# Patient Record
Sex: FEMALE | Race: WHITE | Employment: OTHER | URBAN - METROPOLITAN AREA
[De-identification: names, ages, dates, MRNs, and addresses within clinical notes are randomized per-mention and may not be internally consistent; named-entity substitution may affect disease eponyms.]

---

## 2017-02-09 ENCOUNTER — ANESTHESIA (OUTPATIENT)
Dept: SURGERY | Facility: CLINIC | Age: 72
End: 2017-02-09
Payer: MEDICARE

## 2017-02-09 ENCOUNTER — ANESTHESIA EVENT (OUTPATIENT)
Dept: SURGERY | Facility: CLINIC | Age: 72
End: 2017-02-09
Payer: MEDICARE

## 2017-02-09 ENCOUNTER — HOSPITAL ENCOUNTER (OUTPATIENT)
Facility: CLINIC | Age: 72
Discharge: HOME | End: 2017-02-09
Attending: OTOLARYNGOLOGY | Admitting: OTOLARYNGOLOGY
Payer: MEDICARE

## 2017-02-09 VITALS
RESPIRATION RATE: 15 BRPM | BODY MASS INDEX: 18.41 KG/M2 | OXYGEN SATURATION: 96 % | HEIGHT: 71 IN | WEIGHT: 131.5 LBS | TEMPERATURE: 99.6 F | DIASTOLIC BLOOD PRESSURE: 85 MMHG | SYSTOLIC BLOOD PRESSURE: 164 MMHG

## 2017-02-09 DIAGNOSIS — J31.0 CHRONIC RHINITIS: Primary | ICD-10-CM

## 2017-02-09 LAB
BACTERIA SPEC CULT: NORMAL
Lab: NORMAL
MICRO REPORT STATUS: NORMAL
SPECIMEN SOURCE: NORMAL

## 2017-02-09 PROCEDURE — 88311 DECALCIFY TISSUE: CPT | Mod: 91 | Performed by: OTOLARYNGOLOGY

## 2017-02-09 PROCEDURE — 27210794 ZZH OR GENERAL SUPPLY STERILE: Performed by: OTOLARYNGOLOGY

## 2017-02-09 PROCEDURE — 25000132 ZZH RX MED GY IP 250 OP 250 PS 637: Mod: GY | Performed by: ANESTHESIOLOGY

## 2017-02-09 PROCEDURE — 88311 DECALCIFY TISSUE: CPT | Mod: 26 | Performed by: OTOLARYNGOLOGY

## 2017-02-09 PROCEDURE — 37000008 ZZH ANESTHESIA TECHNICAL FEE, 1ST 30 MIN: Performed by: OTOLARYNGOLOGY

## 2017-02-09 PROCEDURE — 87070 CULTURE OTHR SPECIMN AEROBIC: CPT | Mod: 91 | Performed by: OTOLARYNGOLOGY

## 2017-02-09 PROCEDURE — 36000073 ZZH SURGERY LEVEL 6 1ST 30 MIN: Performed by: OTOLARYNGOLOGY

## 2017-02-09 PROCEDURE — A9270 NON-COVERED ITEM OR SERVICE: HCPCS | Mod: GY | Performed by: ANESTHESIOLOGY

## 2017-02-09 PROCEDURE — 25000125 ZZHC RX 250: Performed by: ANESTHESIOLOGY

## 2017-02-09 PROCEDURE — 88304 TISSUE EXAM BY PATHOLOGIST: CPT | Performed by: OTOLARYNGOLOGY

## 2017-02-09 PROCEDURE — 25000132 ZZH RX MED GY IP 250 OP 250 PS 637: Mod: GY | Performed by: OTOLARYNGOLOGY

## 2017-02-09 PROCEDURE — 37000009 ZZH ANESTHESIA TECHNICAL FEE, EACH ADDTL 15 MIN: Performed by: OTOLARYNGOLOGY

## 2017-02-09 PROCEDURE — 27211024 ZZHC OR SUPPLY OTHER OPNP: Performed by: OTOLARYNGOLOGY

## 2017-02-09 PROCEDURE — 40000170 ZZH STATISTIC PRE-PROCEDURE ASSESSMENT II: Performed by: OTOLARYNGOLOGY

## 2017-02-09 PROCEDURE — 25800025 ZZH RX 258: Performed by: NURSE ANESTHETIST, CERTIFIED REGISTERED

## 2017-02-09 PROCEDURE — 27210995 ZZH RX 272: Performed by: OTOLARYNGOLOGY

## 2017-02-09 PROCEDURE — 71000012 ZZH RECOVERY PHASE 1 LEVEL 1 FIRST HR: Performed by: OTOLARYNGOLOGY

## 2017-02-09 PROCEDURE — 36000075 ZZH SURGERY LEVEL 6 EA 15 ADDTL MIN: Performed by: OTOLARYNGOLOGY

## 2017-02-09 PROCEDURE — 25000128 H RX IP 250 OP 636: Performed by: OTOLARYNGOLOGY

## 2017-02-09 PROCEDURE — 25000125 ZZHC RX 250: Performed by: OTOLARYNGOLOGY

## 2017-02-09 PROCEDURE — 71000013 ZZH RECOVERY PHASE 1 LEVEL 1 EA ADDTL HR: Performed by: OTOLARYNGOLOGY

## 2017-02-09 PROCEDURE — 25000125 ZZHC RX 250: Performed by: NURSE ANESTHETIST, CERTIFIED REGISTERED

## 2017-02-09 PROCEDURE — A9270 NON-COVERED ITEM OR SERVICE: HCPCS | Mod: GY | Performed by: OTOLARYNGOLOGY

## 2017-02-09 PROCEDURE — 71000027 ZZH RECOVERY PHASE 2 EACH 15 MINS: Performed by: OTOLARYNGOLOGY

## 2017-02-09 PROCEDURE — 25000566 ZZH SEVOFLURANE, EA 15 MIN: Performed by: OTOLARYNGOLOGY

## 2017-02-09 PROCEDURE — 88304 TISSUE EXAM BY PATHOLOGIST: CPT | Mod: 26,76 | Performed by: OTOLARYNGOLOGY

## 2017-02-09 PROCEDURE — 25000128 H RX IP 250 OP 636: Performed by: NURSE ANESTHETIST, CERTIFIED REGISTERED

## 2017-02-09 PROCEDURE — 87070 CULTURE OTHR SPECIMN AEROBIC: CPT | Performed by: OTOLARYNGOLOGY

## 2017-02-09 RX ORDER — MAGNESIUM HYDROXIDE 1200 MG/15ML
LIQUID ORAL PRN
Status: DISCONTINUED | OUTPATIENT
Start: 2017-02-09 | End: 2017-02-09 | Stop reason: HOSPADM

## 2017-02-09 RX ORDER — CEFAZOLIN SODIUM 1 G/3ML
1 INJECTION, POWDER, FOR SOLUTION INTRAMUSCULAR; INTRAVENOUS SEE ADMIN INSTRUCTIONS
Status: DISCONTINUED | OUTPATIENT
Start: 2017-02-09 | End: 2017-02-09 | Stop reason: HOSPADM

## 2017-02-09 RX ORDER — OXYCODONE AND ACETAMINOPHEN 5; 325 MG/1; MG/1
1 TABLET ORAL ONCE
Status: COMPLETED | OUTPATIENT
Start: 2017-02-09 | End: 2017-02-09

## 2017-02-09 RX ORDER — CEFAZOLIN SODIUM 2 G/100ML
2 INJECTION, SOLUTION INTRAVENOUS
Status: COMPLETED | OUTPATIENT
Start: 2017-02-09 | End: 2017-02-09

## 2017-02-09 RX ORDER — FENTANYL CITRATE 50 UG/ML
25-50 INJECTION, SOLUTION INTRAMUSCULAR; INTRAVENOUS DAILY PRN
Status: DISCONTINUED | OUTPATIENT
Start: 2017-02-09 | End: 2017-02-09 | Stop reason: HOSPADM

## 2017-02-09 RX ORDER — LIDOCAINE HYDROCHLORIDE AND EPINEPHRINE BITARTRATE 20; .01 MG/ML; MG/ML
INJECTION, SOLUTION SUBCUTANEOUS
Status: DISCONTINUED
Start: 2017-02-09 | End: 2017-02-09 | Stop reason: HOSPADM

## 2017-02-09 RX ORDER — EPINEPHRINE NASAL SOLUTION 1 MG/ML
SOLUTION NASAL PRN
Status: DISCONTINUED | OUTPATIENT
Start: 2017-02-09 | End: 2017-02-09 | Stop reason: HOSPADM

## 2017-02-09 RX ORDER — OXYMETAZOLINE HYDROCHLORIDE 0.05 G/100ML
SPRAY NASAL
Status: DISCONTINUED
Start: 2017-02-09 | End: 2017-02-09 | Stop reason: HOSPADM

## 2017-02-09 RX ORDER — MUPIROCIN 20 MG/G
OINTMENT TOPICAL PRN
Status: DISCONTINUED | OUTPATIENT
Start: 2017-02-09 | End: 2017-02-09 | Stop reason: HOSPADM

## 2017-02-09 RX ORDER — ONDANSETRON 2 MG/ML
4 INJECTION INTRAMUSCULAR; INTRAVENOUS EVERY 30 MIN PRN
Status: DISCONTINUED | OUTPATIENT
Start: 2017-02-09 | End: 2017-02-09 | Stop reason: HOSPADM

## 2017-02-09 RX ORDER — CEPHALEXIN 500 MG/1
500 CAPSULE ORAL 2 TIMES DAILY
Qty: 14 CAPSULE | Refills: 0 | Status: SHIPPED | OUTPATIENT
Start: 2017-02-09 | End: 2017-05-20

## 2017-02-09 RX ORDER — DEXAMETHASONE SODIUM PHOSPHATE 4 MG/ML
8 INJECTION, SOLUTION INTRA-ARTICULAR; INTRALESIONAL; INTRAMUSCULAR; INTRAVENOUS; SOFT TISSUE ONCE
Status: COMPLETED | OUTPATIENT
Start: 2017-02-09 | End: 2017-02-09

## 2017-02-09 RX ORDER — NEOSTIGMINE METHYLSULFATE 1 MG/ML
VIAL (ML) INJECTION PRN
Status: DISCONTINUED | OUTPATIENT
Start: 2017-02-09 | End: 2017-02-09

## 2017-02-09 RX ORDER — SODIUM CHLORIDE, SODIUM LACTATE, POTASSIUM CHLORIDE, CALCIUM CHLORIDE 600; 310; 30; 20 MG/100ML; MG/100ML; MG/100ML; MG/100ML
INJECTION, SOLUTION INTRAVENOUS CONTINUOUS
Status: DISCONTINUED | OUTPATIENT
Start: 2017-02-09 | End: 2017-02-09 | Stop reason: HOSPADM

## 2017-02-09 RX ORDER — OXYMETAZOLINE HYDROCHLORIDE 0.05 G/100ML
SPRAY NASAL PRN
Status: DISCONTINUED | OUTPATIENT
Start: 2017-02-09 | End: 2017-02-09 | Stop reason: HOSPADM

## 2017-02-09 RX ORDER — NALOXONE HYDROCHLORIDE 0.4 MG/ML
.1-.4 INJECTION, SOLUTION INTRAMUSCULAR; INTRAVENOUS; SUBCUTANEOUS
Status: DISCONTINUED | OUTPATIENT
Start: 2017-02-09 | End: 2017-02-09 | Stop reason: HOSPADM

## 2017-02-09 RX ORDER — FENTANYL CITRATE 50 UG/ML
25-50 INJECTION, SOLUTION INTRAMUSCULAR; INTRAVENOUS
Status: DISCONTINUED | OUTPATIENT
Start: 2017-02-09 | End: 2017-02-09 | Stop reason: HOSPADM

## 2017-02-09 RX ORDER — ONDANSETRON 2 MG/ML
INJECTION INTRAMUSCULAR; INTRAVENOUS PRN
Status: DISCONTINUED | OUTPATIENT
Start: 2017-02-09 | End: 2017-02-09

## 2017-02-09 RX ORDER — PROPOFOL 10 MG/ML
INJECTION, EMULSION INTRAVENOUS CONTINUOUS PRN
Status: DISCONTINUED | OUTPATIENT
Start: 2017-02-09 | End: 2017-02-09

## 2017-02-09 RX ORDER — LIDOCAINE HYDROCHLORIDE AND EPINEPHRINE BITARTRATE 20; .01 MG/ML; MG/ML
INJECTION, SOLUTION SUBCUTANEOUS PRN
Status: DISCONTINUED | OUTPATIENT
Start: 2017-02-09 | End: 2017-02-09 | Stop reason: HOSPADM

## 2017-02-09 RX ORDER — MEPERIDINE HYDROCHLORIDE 25 MG/ML
12.5 INJECTION INTRAMUSCULAR; INTRAVENOUS; SUBCUTANEOUS
Status: DISCONTINUED | OUTPATIENT
Start: 2017-02-09 | End: 2017-02-09 | Stop reason: HOSPADM

## 2017-02-09 RX ORDER — PROPOFOL 10 MG/ML
INJECTION, EMULSION INTRAVENOUS PRN
Status: DISCONTINUED | OUTPATIENT
Start: 2017-02-09 | End: 2017-02-09

## 2017-02-09 RX ORDER — LIDOCAINE HYDROCHLORIDE 20 MG/ML
INJECTION, SOLUTION INFILTRATION; PERINEURAL PRN
Status: DISCONTINUED | OUTPATIENT
Start: 2017-02-09 | End: 2017-02-09

## 2017-02-09 RX ORDER — ACETAMINOPHEN 325 MG/1
975 TABLET ORAL ONCE
Status: COMPLETED | OUTPATIENT
Start: 2017-02-09 | End: 2017-02-09

## 2017-02-09 RX ORDER — OXYCODONE AND ACETAMINOPHEN 5; 325 MG/1; MG/1
1-2 TABLET ORAL EVERY 4 HOURS PRN
COMMUNITY
End: 2017-05-20

## 2017-02-09 RX ORDER — OXYCODONE HYDROCHLORIDE 5 MG/1
5-10 TABLET ORAL
Status: DISCONTINUED | OUTPATIENT
Start: 2017-02-09 | End: 2017-02-09 | Stop reason: HOSPADM

## 2017-02-09 RX ORDER — LIDOCAINE HYDROCHLORIDE AND EPINEPHRINE 10; 10 MG/ML; UG/ML
INJECTION, SOLUTION INFILTRATION; PERINEURAL PRN
Status: DISCONTINUED | OUTPATIENT
Start: 2017-02-09 | End: 2017-02-09 | Stop reason: HOSPADM

## 2017-02-09 RX ORDER — GLYCOPYRROLATE 0.2 MG/ML
INJECTION, SOLUTION INTRAMUSCULAR; INTRAVENOUS PRN
Status: DISCONTINUED | OUTPATIENT
Start: 2017-02-09 | End: 2017-02-09

## 2017-02-09 RX ORDER — EPINEPHRINE NASAL SOLUTION 1 MG/ML
SOLUTION NASAL
Status: DISCONTINUED
Start: 2017-02-09 | End: 2017-02-09 | Stop reason: HOSPADM

## 2017-02-09 RX ORDER — SODIUM CHLORIDE, SODIUM LACTATE, POTASSIUM CHLORIDE, CALCIUM CHLORIDE 600; 310; 30; 20 MG/100ML; MG/100ML; MG/100ML; MG/100ML
INJECTION, SOLUTION INTRAVENOUS CONTINUOUS PRN
Status: DISCONTINUED | OUTPATIENT
Start: 2017-02-09 | End: 2017-02-09

## 2017-02-09 RX ORDER — GABAPENTIN 300 MG/1
300 CAPSULE ORAL ONCE
Status: COMPLETED | OUTPATIENT
Start: 2017-02-09 | End: 2017-02-09

## 2017-02-09 RX ORDER — ONDANSETRON 4 MG/1
4 TABLET, ORALLY DISINTEGRATING ORAL EVERY 30 MIN PRN
Status: DISCONTINUED | OUTPATIENT
Start: 2017-02-09 | End: 2017-02-09 | Stop reason: HOSPADM

## 2017-02-09 RX ORDER — MUPIROCIN 20 MG/G
OINTMENT TOPICAL
Status: DISCONTINUED
Start: 2017-02-09 | End: 2017-02-09 | Stop reason: HOSPADM

## 2017-02-09 RX ADMIN — PHENYLEPHRINE HYDROCHLORIDE 100 MCG: 10 INJECTION, SOLUTION INTRAMUSCULAR; INTRAVENOUS; SUBCUTANEOUS at 08:39

## 2017-02-09 RX ADMIN — FENTANYL CITRATE 25 MCG: 50 INJECTION, SOLUTION INTRAMUSCULAR; INTRAVENOUS at 11:41

## 2017-02-09 RX ADMIN — LIDOCAINE HYDROCHLORIDE 100 MG: 20 INJECTION, SOLUTION INFILTRATION; PERINEURAL at 07:37

## 2017-02-09 RX ADMIN — OXYCODONE HYDROCHLORIDE AND ACETAMINOPHEN 1 TABLET: 5; 325 TABLET ORAL at 13:47

## 2017-02-09 RX ADMIN — GABAPENTIN 300 MG: 300 CAPSULE ORAL at 07:24

## 2017-02-09 RX ADMIN — SODIUM CHLORIDE, POTASSIUM CHLORIDE, SODIUM LACTATE AND CALCIUM CHLORIDE: 600; 310; 30; 20 INJECTION, SOLUTION INTRAVENOUS at 07:36

## 2017-02-09 RX ADMIN — NEOSTIGMINE METHYLSULFATE 3 MG: 1 INJECTION INTRAMUSCULAR; INTRAVENOUS; SUBCUTANEOUS at 11:45

## 2017-02-09 RX ADMIN — PHENYLEPHRINE HYDROCHLORIDE 100 MCG: 10 INJECTION, SOLUTION INTRAMUSCULAR; INTRAVENOUS; SUBCUTANEOUS at 08:16

## 2017-02-09 RX ADMIN — SODIUM CHLORIDE, POTASSIUM CHLORIDE, SODIUM LACTATE AND CALCIUM CHLORIDE: 600; 310; 30; 20 INJECTION, SOLUTION INTRAVENOUS at 10:14

## 2017-02-09 RX ADMIN — PROPOFOL 170 MG: 10 INJECTION, EMULSION INTRAVENOUS at 07:37

## 2017-02-09 RX ADMIN — FENTANYL CITRATE 50 MCG: 50 INJECTION, SOLUTION INTRAMUSCULAR; INTRAVENOUS at 12:40

## 2017-02-09 RX ADMIN — ROCURONIUM BROMIDE 50 MG: 10 INJECTION INTRAVENOUS at 07:37

## 2017-02-09 RX ADMIN — GLYCOPYRROLATE 0.4 MG: 0.2 INJECTION, SOLUTION INTRAMUSCULAR; INTRAVENOUS at 11:45

## 2017-02-09 RX ADMIN — FENTANYL CITRATE 50 MCG: 50 INJECTION, SOLUTION INTRAMUSCULAR; INTRAVENOUS at 12:23

## 2017-02-09 RX ADMIN — PHENYLEPHRINE HYDROCHLORIDE 100 MCG: 10 INJECTION, SOLUTION INTRAMUSCULAR; INTRAVENOUS; SUBCUTANEOUS at 08:53

## 2017-02-09 RX ADMIN — PHENYLEPHRINE HYDROCHLORIDE 100 MCG: 10 INJECTION, SOLUTION INTRAMUSCULAR; INTRAVENOUS; SUBCUTANEOUS at 08:26

## 2017-02-09 RX ADMIN — PROPOFOL 50 MCG/KG/MIN: 10 INJECTION, EMULSION INTRAVENOUS at 07:37

## 2017-02-09 RX ADMIN — FENTANYL CITRATE 25 MCG: 50 INJECTION, SOLUTION INTRAMUSCULAR; INTRAVENOUS at 09:53

## 2017-02-09 RX ADMIN — PHENYLEPHRINE HYDROCHLORIDE 100 MCG: 10 INJECTION, SOLUTION INTRAMUSCULAR; INTRAVENOUS; SUBCUTANEOUS at 09:12

## 2017-02-09 RX ADMIN — CEFAZOLIN 1 G: 1 INJECTION, POWDER, FOR SOLUTION INTRAMUSCULAR; INTRAVENOUS at 11:38

## 2017-02-09 RX ADMIN — DEXAMETHASONE SODIUM PHOSPHATE 8 MG: 4 INJECTION, SOLUTION INTRAMUSCULAR; INTRAVENOUS at 08:00

## 2017-02-09 RX ADMIN — ONDANSETRON 4 MG: 2 INJECTION INTRAMUSCULAR; INTRAVENOUS at 11:38

## 2017-02-09 RX ADMIN — CEFAZOLIN SODIUM 2 G: 2 INJECTION, SOLUTION INTRAVENOUS at 07:40

## 2017-02-09 RX ADMIN — ONDANSETRON 4 MG: 2 INJECTION INTRAMUSCULAR; INTRAVENOUS at 08:00

## 2017-02-09 RX ADMIN — ACETAMINOPHEN 975 MG: 325 TABLET, FILM COATED ORAL at 07:25

## 2017-02-09 RX ADMIN — PROPOFOL 25 MCG/KG/MIN: 10 INJECTION, EMULSION INTRAVENOUS at 09:20

## 2017-02-09 RX ADMIN — FENTANYL CITRATE 100 MCG: 50 INJECTION, SOLUTION INTRAMUSCULAR; INTRAVENOUS at 07:37

## 2017-02-09 ASSESSMENT — COPD QUESTIONNAIRES: COPD: 0

## 2017-02-09 ASSESSMENT — LIFESTYLE VARIABLES: TOBACCO_USE: 0

## 2017-02-09 NOTE — IP AVS SNAPSHOT
[facility] Same Day Surgery    [address]    [place] [address]    Phone:  [phone]    Fax:  [phone]                                       After Visit Summary   2/9/2017    Lori Mane    MRN: [id number]           After Visit Summary Signature Page     I have received my discharge instructions, and my questions have been answered. I have discussed any challenges I see with this plan with the nurse or doctor.    ..........................................................................................................................................  Patient/Patient Representative Signature      ..........................................................................................................................................  Patient Representative Print Name and Relationship to Patient    ..................................................               ................................................  Date                                            Time    ..........................................................................................................................................  Reviewed by Signature/Title    ...................................................              ..............................................  Date                                                            Time

## 2017-02-09 NOTE — DISCHARGE INSTRUCTIONS
While you were at the hospital today you were given 975 mg of Tylenol at 0730. The recommended daily maximum dose is 4000 mg.       Same Day Surgery Discharge Instructions for  Sedation and General Anesthesia       It's not unusual to feel dizzy, light-headed or faint for up to 24 hours after surgery or while taking pain medication.  If you have these symptoms: sit for a few minutes before standing and have someone assist you when you get up to walk or use the bathroom.      You should rest and relax for the next 24 hours. We recommend you make arrangements to have an adult stay with you for at least 24 hours after your discharge.  Avoid hazardous and strenuous activity.      DO NOT DRIVE any vehicle or operate mechanical equipment for 24 hours following the end of your surgery.  Even though you may feel normal, your reactions may be affected by the medication you have received.      Do not drink alcoholic beverages for 24 hours following surgery.       Slowly progress to your regular diet as you feel able. It's not unusual to feel nauseated and/or vomit after receiving anesthesia.  If you develop these symptoms, drink clear liquids (apple juice, ginger ale, broth, 7-up, etc. ) until you feel better.  If your nausea and vomiting persists for 24 hours, please notify your surgeon.        All narcotic pain medications, along with inactivity and anesthesia, can cause constipation. Drinking plenty of liquids and increasing fiber intake will help.      For any questions of a medical nature, call your surgeon.      Do not make important decisions for 24 hours.      If you had general anesthesia, you may have a sore throat for a couple of days related to the breathing tube used during surgery.  You may use Cepacol lozenges to help with this discomfort.  If it worsens or if you develop a fever, contact your surgeon.       If you feel your pain is not well managed with the pain medications prescribed by your surgeon, please  contact your surgeon's office to let them know so they can address your concerns.       [facility]  Nasal Surgery Discharge Instructions  Angel Jernigan MD, Martinez Hughes MD, & Vaughn Marks MD, Alyson Melchor MD, Sen Howard MD    ? Do not blow, bump, or manipulate your nose.    ? If there is a splint on the outside of your nose, keep this splint and tape around it dry so that it doesn t come off.    ? You may continue to have nasal drainage for several days after surgery. Continue using the gauze pads under the nose to catch this drainage rather than wiping the nose or blowing it. When the drainage stops, you can stop applying the pads.    ? Do not take aspirin as it can increase the chance of bleeding.    ? Refrain from strenuous activity, heaving lifting, or any other activity that would cause you to strain and raise the blood pressure in your head for two weeks. Resuming activity too soon after surgery can cause headaches.    ? If you are experiencing swelling around the eyes, the more you sit in an upright position, the faster the swelling will go down. Notify the doctor if there is any bruising or swelling of the eye or a vision problem occurs.    ? It is normal for the nose to feel congested after surgery since the tissues are likely to be swollen. This feeling will subside over a one to two week period.    ? Thin plastic splints may have been sewn over the septum of your nose. You may see these splints inside your nose. They will be removed when you return to the office.    ? If you have any problems or other questions, please call [phone].    [facility]  [phone]

## 2017-02-09 NOTE — IP AVS SNAPSHOT
MRN:[id number]                      After Visit Summary   2/9/2017    Lori Mane    MRN: [id number]           Thank you!     Thank you for choosing [facility] for your care. Our goal is always to provide you with excellent care. Hearing back from our patients is one way we can continue to improve our services. Please take a few minutes to complete the written survey that you may receive in the mail after you visit with us. Thank you!        Patient Information     Date Of Birth          1945        About your hospital stay     You were admitted on:  February 9, 2017 [facility]    You were discharged on:  February 9, 2017       Who to Call     For medical emergencies, please call 911.  For non-urgent questions about your medical care, please call your primary care provider or clinic, [phone]  For questions related to your surgery, please call your surgery clinic        Attending Provider     Provider    Angel Jernigan MD       Primary Care Provider Office Phone # Fax #    Gregory Sheriff [phone]       [place] [address]        After Care Instructions     Discharge Instructions       Patient to follow up with surgeon Wednesday February 15.            No Aspirin, Ibuprofen or Naproxen products       for 14 days post surgery                  Further instructions from your care team       While you were at the hospital today you were given 975 mg of Tylenol at 0730. The recommended daily maximum dose is 4000 mg.       Same Day Surgery Discharge Instructions for  Sedation and General Anesthesia       It's not unusual to feel dizzy, light-headed or faint for up to 24 hours after surgery or while taking pain medication.  If you have these symptoms: sit for a few minutes before standing and have someone assist you when you get up to walk or use the bathroom.      You  should rest and relax for the next 24 hours. We recommend you make arrangements to have an adult stay with you for at least 24 hours after your discharge.  Avoid hazardous and strenuous activity.      DO NOT DRIVE any vehicle or operate mechanical equipment for 24 hours following the end of your surgery.  Even though you may feel normal, your reactions may be affected by the medication you have received.      Do not drink alcoholic beverages for 24 hours following surgery.       Slowly progress to your regular diet as you feel able. It's not unusual to feel nauseated and/or vomit after receiving anesthesia.  If you develop these symptoms, drink clear liquids (apple juice, ginger ale, broth, 7-up, etc. ) until you feel better.  If your nausea and vomiting persists for 24 hours, please notify your surgeon.        All narcotic pain medications, along with inactivity and anesthesia, can cause constipation. Drinking plenty of liquids and increasing fiber intake will help.      For any questions of a medical nature, call your surgeon.      Do not make important decisions for 24 hours.      If you had general anesthesia, you may have a sore throat for a couple of days related to the breathing tube used during surgery.  You may use Cepacol lozenges to help with this discomfort.  If it worsens or if you develop a fever, contact your surgeon.       If you feel your pain is not well managed with the pain medications prescribed by your surgeon, please contact your surgeon's office to let them know so they can address your concerns.       [facility]  Nasal Surgery Discharge Instructions  Angel Jernigan MD, Martinez Hughes MD, & Vaughn Marks MD, Alyson Melchor MD, Sen Howard MD    ? Do not blow, bump, or manipulate your nose.    ? If there is a splint on the outside of your nose, keep this splint and tape around it dry so that it doesn t come off.    ? You may continue to have nasal drainage for  "several days after surgery. Continue using the gauze pads under the nose to catch this drainage rather than wiping the nose or blowing it. When the drainage stops, you can stop applying the pads.    ? Do not take aspirin as it can increase the chance of bleeding.    ? Refrain from strenuous activity, heaving lifting, or any other activity that would cause you to strain and raise the blood pressure in your head for two weeks. Resuming activity too soon after surgery can cause headaches.    ? If you are experiencing swelling around the eyes, the more you sit in an upright position, the faster the swelling will go down. Notify the doctor if there is any bruising or swelling of the eye or a vision problem occurs.    ? It is normal for the nose to feel congested after surgery since the tissues are likely to be swollen. This feeling will subside over a one to two week period.    ? Thin plastic splints may have been sewn over the septum of your nose. You may see these splints inside your nose. They will be removed when you return to the office.    ? If you have any problems or other questions, please call [phone].    [facility]  [phone]    Pending Results     Date and Time Order Name Status Description    2/9/2017 0826 Sinus Culture Aerobic Bacterial In process     2/9/2017 0749 Nose Culture Aerobic Bacterial Preliminary             Admission Information        Provider Department Dept Phone    2/9/2017 Angel Jernigan MD [facility] [phone]      Your Vitals Were     Blood Pressure Temperature Respirations    134/65 mmHg 99.6  F (37.6  C) (Temporal) 10    Height Weight BMI (Body Mass Index)    1.803 m (5' 11\") 59.648 kg (131 lb 8 oz) 18.35 kg/m2    Pulse Oximetry          92%        MyChart Information     [employer] lets you send messages to your doctor, view your test results, renew your prescriptions, schedule appointments and more. To sign up, go to [email or web address] . Click " "on \"Log in\" on the left side of the screen, which will take you to the Welcome page. Then click on \"Sign up Now\" on the right side of the page.     You will be asked to enter the access code listed below, as well as some personal information. Please follow the directions to create your username and password.     Your access code is: [email or web address]  Expires: 5/10/2017 11:46 AM     Your access code will  in 90 days. If you need help or a new code, please call your [place] clinic or [phone].        Care EveryWhere ID     This is your Care EveryWhere ID. This could be used by other organizations to access your [place] medical records  [id number]           Review of your medicines      UNREVIEWED medicines. Ask your doctor about these medicines        Dose / Directions    albuterol 108 (90 BASE) MCG/ACT Inhaler   Commonly known as:  PROAIR HFA/PROVENTIL HFA/VENTOLIN HFA        Dose:  2 puff   Inhale 2 puffs into the lungs every 6 hours.   Refills:  0       calcium carbonate 500 MG tablet   Commonly known as:  OS-SILVINO 500 mg [place]. Ca        3 tablets daily   Refills:  0       estrogen (conjugated)-medroxyPROGESTERone 0.3-1.5 MG per tablet   Commonly known as:  PREMPRO        Dose:  1 tablet   Take 1 tablet by mouth daily   Refills:  0       glucosamine-chondroitin 500-400 MG Caps per capsule        Dose:  1 capsule   Take 1 capsule by mouth daily.   Refills:  0       MIRALAX Packet   Generic drug:  polyethylene glycol        2 scoops po q am -  start on 10/2   Quantity:  1 can   Refills:  11       MULTIVITAMIN ADULT PO        Dose:  2 chew tab   Take 2 chew tab by mouth daily   Refills:  0       NAPROXEN PO        Dose:  220 mg   Take 220 mg by mouth as needed for moderate pain   Refills:  0       vitamin B complex with vitamin C Tabs tablet   Commonly known as:  STRESS TAB        Dose:  1 tablet   Take 1 tablet by mouth daily.   Refills:  0         START taking        Dose / Directions    cephALEXin 500 MG " capsule   Commonly known as:  KEFLEX   Used for:  Chronic rhinitis        Dose:  500 mg   Take 1 capsule (500 mg) by mouth 2 times daily   Quantity:  14 capsule   Refills:  0         CONTINUE these medicines which have NOT CHANGED        Dose / Directions    oxyCODONE-acetaminophen 5-325 MG per tablet   Commonly known as:  PERCOCET        Dose:  1-2 tablet   Take 1-2 tablets by mouth every 4 hours as needed for moderate to severe pain   Refills:  0            Where to get your medicines      These medications were sent to [facility] - [address]  [address], [place] [address]     Phone:  [phone]    - cephALEXin 500 MG capsule             Protect others around you: Learn how to safely use, store and throw away your medicines at www.disposemymeds.org.             Medication List: This is a list of all your medications and when to take them. Check marks below indicate your daily home schedule. Keep this list as a reference.      Medications           Morning Afternoon Evening Bedtime As Needed    albuterol 108 (90 BASE) MCG/ACT Inhaler   Commonly known as:  PROAIR HFA/PROVENTIL HFA/VENTOLIN HFA   Inhale 2 puffs into the lungs every 6 hours.                                calcium carbonate 500 MG tablet   Commonly known as:  OS-SILVINO 500 mg [place]. Ca   3 tablets daily                                cephALEXin 500 MG capsule   Commonly known as:  KEFLEX   Take 1 capsule (500 mg) by mouth 2 times daily                                estrogen (conjugated)-medroxyPROGESTERone 0.3-1.5 MG per tablet   Commonly known as:  PREMPRO   Take 1 tablet by mouth daily                                glucosamine-chondroitin 500-400 MG Caps per capsule   Take 1 capsule by mouth daily.                                MIRALAX Packet   2 scoops po q am -  start on 10/2   Generic drug:  polyethylene glycol                                MULTIVITAMIN ADULT PO   Take 2 chew tab by mouth daily                                 NAPROXEN PO   Take 220 mg by mouth as needed for moderate pain                                oxyCODONE-acetaminophen 5-325 MG per tablet   Commonly known as:  PERCOCET   Take 1-2 tablets by mouth every 4 hours as needed for moderate to severe pain                                vitamin B complex with vitamin C Tabs tablet   Commonly known as:  STRESS TAB   Take 1 tablet by mouth daily.

## 2017-02-09 NOTE — BRIEF OP NOTE
[facility] Brief Operative Note    Pre-operative diagnosis: CHRONIC SINUSITIS AND SEPTAL DEVIATION    Post-operative diagnosis    Procedure: Procedure(s):  ENDOSCOPIC SINUS SURGERY,  OPEN SEPTOPLASTY, LATERAL NASAL WALL RECONSTRUCTION, Jasiel Bullosa resection - Wound Class: II-Clean Contaminated   - Wound Class: II-Clean Contaminated   Surgeon(s): Surgeon(s) and Role:     * Angel Jernigan MD - Primary   Estimated blood loss: 15 mL    Specimens:   ID Type Source Tests Collected by Time Destination   1 : BILATERAL NOSE CULTURE Fluid Nose FLUID CULTURE AEROBIC BACTERIAL, LABORATORY MISCELLANEOUS ORDER Angel Jernigan MD 2/9/2017  7:49 AM    2 : LEFT MAXILLARY SINUS AEROBIC CULTURE Fluid Nose FLUID CULTURE AEROBIC BACTERIAL, LABORATORY MISCELLANEOUS ORDER Angel Jernigan MD 2/9/2017  8:26 AM    A : LEFT SINUS CONTENTS Tissue Sinus Contents, Left SURGICAL PATHOLOGY EXAM Angel Jernigan MD 2/9/2017  8:20 AM    B : RIGHT JASIEL BULLOSA Tissue Nose SURGICAL PATHOLOGY EXAM Angel Jernigan MD 2/9/2017  8:33 AM    C : RIGHT MAXILLARY SINUS CONTENTS Tissue Sinus Contents, Maxillary, Right SURGICAL PATHOLOGY EXAM Angel Jernigan MD 2/9/2017  8:37 AM       Findings:

## 2017-02-09 NOTE — ANESTHESIA CARE TRANSFER NOTE
Patient: Lori Mane    Procedure(s):  ENDOSCOPIC SINUS SURGERY,  OPEN SEPTOPLASTY, LATERAL NASAL WALL RECONSTRUCTION, Zoey Bullosa resection - Wound Class: II-Clean Contaminated   - Wound Class: II-Clean Contaminated    Diagnosis: CHRONIC SINUSITIS AND SEPTAL DEVIATION   Diagnosis Additional Information: No value filed.    Anesthesia Type:   General, ETT     Note:  Airway :Face Mask  Patient transferred to:PACU  Comments: Uneventful transport to PACU   Report to RN - Delmis  Exchanging well; colo pink/natl  Pt responds appropriately to command  IV patent  Lips/teeth/dentition as preop status  Questions answered  /66   st  TAT 99.6  RR 16  Sat 96-97  Note: bain removed by RN prior leaving OR - output 200 mL and recorded by RN        Vitals: (Last set prior to Anesthesia Care Transfer)    CRNA VITALS  2/9/2017 1122 - 2/9/2017 1157      2/9/2017             Pulse: 119    Ht Rate: 118    SpO2: 96 %    Resp Rate (observed): (!) 1    Resp Rate (set): 10                Electronically Signed By: QUINTIN INFANTE CRNA  February 9, 2017  11:57 AM

## 2017-02-09 NOTE — ANESTHESIA PREPROCEDURE EVALUATION
Anesthesia Evaluation     . Pt has had prior anesthetic. Type: General    No history of anesthetic complications     ROS/MED HX    ENT/Pulmonary:     (+)Intermittent asthma Treatment: Inhaler prn,  , . .   (-) tobacco use, COPD and sleep apnea   Neurologic: Comment: C4-C5 spinal stenosis  Meniere's      Cardiovascular:        (-) hypertension, CAD, CHF and dyslipidemia   METS/Exercise Tolerance:  >4 METS   Hematologic:  - neg hematologic  ROS       Musculoskeletal:         GI/Hepatic:        (-) GERD and liver disease   Renal/Genitourinary:      (-) renal disease   Endo:      (-) Type I DM and Type II DM   Psychiatric:         Infectious Disease:         Malignancy:         Other:               Physical Exam  Normal systems: cardiovascular and pulmonary    Airway   Mallampati: II  TM distance: >3 FB  Neck ROM: limited    Dental   Comment: Pulled molar  Cracked tooth    Cardiovascular       Pulmonary                     Anesthesia Plan      History & Physical Review  History and physical reviewed and following examination; no interval change.    ASA Status:  2 .    NPO Status:  > 8 hours    Plan for General and ETT with Intravenous induction. Maintenance will be Balanced.    PONV prophylaxis:  Ondansetron (or other 5HT-3) and Dexamethasone or Solumedrol  Additional equipment: Videolaryngoscope 7.0 SHANKAR tube  Glidescope used in the past with some difficulty      Postoperative Care  Postoperative pain management:  IV analgesics and Multi-modal analgesia.      Consents  Anesthetic plan, risks, benefits and alternatives discussed with:  Patient..                          .

## 2017-02-10 LAB — COPATH REPORT: NORMAL

## 2017-02-10 NOTE — ANESTHESIA POSTPROCEDURE EVALUATION
Patient: Lori Mane    Procedure(s):  ENDOSCOPIC SINUS SURGERY,  OPEN SEPTOPLASTY, LATERAL NASAL WALL RECONSTRUCTION, Zoey Bullosa resection - Wound Class: II-Clean Contaminated   - Wound Class: II-Clean Contaminated    Diagnosis:CHRONIC SINUSITIS AND SEPTAL DEVIATION   Diagnosis Additional Information: No value filed.    Anesthesia Type:  General, ETT    Note:  Anesthesia Post Evaluation    Patient location during evaluation: PACU  Patient participation: Able to fully participate in evaluation  Level of consciousness: awake and alert  Pain management: adequate  Airway patency: patent  Cardiovascular status: acceptable  Respiratory status: acceptable  Hydration status: acceptable  PONV: none     Anesthetic complications: None          Last vitals:  Filed Vitals:    02/09/17 1245 02/09/17 1300 02/09/17 1349   BP: 134/65 142/80 164/85   Temp:      Resp: 10  15   SpO2: 92% 94% 96%         Electronically Signed By: Sen Torres MD  February 9, 2017  10:48 PM

## 2017-02-11 LAB
BACTERIA SPEC CULT: NORMAL
BACTERIA SPEC CULT: NORMAL
Lab: NORMAL
MICRO REPORT STATUS: NORMAL
MICRO REPORT STATUS: NORMAL
SPECIMEN SOURCE: NORMAL
SPECIMEN SOURCE: NORMAL

## 2017-02-11 NOTE — OP NOTE
DATE OF PROCEDURE:  02/09/2017      PREOPERATIVE DIAGNOSES:   1.  Chronic maxillary sinusitis.   2.  Septal deviation.   3.  Nasal vestibular stenosis.   4.  Right hollie bullosa.      POSTOPERATIVE DIAGNOSES:     1.  Bilateral chronic rhinitis.   2.  Chronic maxillary sinusitis.   3.  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   4.  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   5.  Right hollie bullosa.      PROCEDURE:   1.  Bilateral endoscopic maxillary antrostomy.   2.  Endoscopic excision of right hollie bullosa.   3.  Open septoplasty.   4.  Open lateral nasal wall reconstruction.   5.  Stereotactic image-guided sinus surgery.      INDICATIONS:  Lori Mane has a history of chronic nasal obstruction and a history of an injury in the past.  She has also had excision of a malignant skin lesion and has had a skin flap to the nose on the right side.  She has complained of chronic drainage from the nose and symptoms of discomfort more on the right than the left.  The possible risks, benefits, alternatives, probabilities of success of the procedures including the risks of continued nasal obstruction, deformity, bleeding, scarring, injury to structures around the sinuses including the eyes, tear duct, and brain, and the possibility of secondary nasal surgery were discussed and consent is obtained.      DESCRIPTION OF PROCEDURE:  Under general endotracheal anesthetic, the patient was prepped and draped and the nose injected in the usual fashion with 2% Xylocaine with 1:100,000 epinephrine.  The deformity consisted of an obvious traumatic break in the caudal septum causing a horizontal deviation to the right and high grade obstruction due to that deformity plus collapse of the right nasal mid vault.  She has CT evidence of chronic maxillary sinusitis.      It should be noted that both sides of the nose had yellow purulent drainage that was collected for culture prior to prepping.  The 0 degree and eventually 30  degree and 45 degree scopes were used first on the left side to visualize the middle meatus.  The uncinate was removed and the natural ostium was visualized and noted to be patent.  It was enlarged to an appropriate size and the interior of the sinus was cultured.  There was some liquid and it was relatively thin liquid in the sinus.  The middle meatus was packed with a cottonoid that had been soaked in topical adrenalin.  Because of the pus on both sides of the nose and the fact that the left maxillary sinus had improved some since the original CT scan, it was felt that recurring sinusitis might be a cause of her symptoms, especially since her symptoms were worse on the right side so a right maxillary antrostomy was also performed after excising the right hollie bullosa.  The image guidance system which had been put into place at the beginning of the procedure and verified for function was used to identify the air pocket within the right middle meatus.  It was incised with a sickle knife and the lateral turbinate wall was removed.  The anterior tip of the turbinate was left intact.  The mucosa inside the hollie bullosa seemed thickened.  There was no fluid found in the right maxillary sinus.      A transcolumellar incision was made and connected to marginal incisions to address the severe septal deviation and vestibular collapse.  It should also be noted that the lateral end of the right lower lateral cartilage was curled medially and reducing the right nasal airway in addition to the other deformities already described.  Consequently upon elevation of the dorsal skin flap the right lower lateral cartilage was dissected and the last few mm which were curled inferiorly and medially were excised leaving enough remaining cartilage to support the ala.        The medial crura of the lower lateral cartilages were pulled laterally with skin hooks and dissection carried out between them.  They were buckled in what appeared  to be an injury that may have occurred that pushed them towards the face.  That buckling was consistent with what was found when the caudal end of the septum was exposed.  This deformity revealed a sharp deflection causing a sharp deviation horizontally to the right impinging on the lateral nasal wall and causing obstruction in the valve area.  Careful dissection was used to dissect multiple pieces of cartilage anteriorly and it could be seen that much of the septum both bony and cartilaginous was missing despite the fact that she had not had previous surgery.  The septal flaps were carefully  all the way back to the posterior perpendicular plate of the ethmoid where some thin bone was identified and removed to be used later in the procedure.  It was stored in saline.  The nasal dorsum was exposed and an incision was made between the right upper lateral cartilage and the septum.  A strip of cartilage that was removed from the quadrangular cartilage where it was draped over the maxillary crest to the right was trimmed and used to create a  graft between the upper lateral cartilage and the septum.  It was put in place and held there with a 5-0 PDS suture through the septum graft and upper lateral cartilage in a mattress type suture.  The multiple pieces of cartilage that were forming the irregular caudal end of the septum were then addressed.  The inferior piece was anchored to a deformed nasal spine which seemed to be in the form of small trough with the left side longer than the right.  The remaining pieces were lined up in the midline and then the thin bone that had been resected from the perpendicular plate and perforated in 2 places was placed along this group of cartilage fragments and 5-0 PDS suture was used to splint the fragments with the thin bone creating a straight appearing caudal end of the septum.  The septal flaps were reapplied in the midline using a running through and through suture  of 4-0 plain.  A piece of cartilage was placed between the buckled medial crura as a columellar strut graft and used to splint the buckled area producing a straight medial franklyn on each side and giving more support to the columella.  The entire septum now appeared midline all the way to the columella.  There was only one small drainage opening in the right septal flap which was left in place.  No other tears in the septal mucosa were made.  The marginal incisions were closed using interrupted 4-0 chromic.  A single 4-0 chromic suture was used subcutaneously to close the midline of the transcolumellar incision.  The skin of the transcolumellar incision was closed using interrupted 6-0 chromic.  Oxymetazoline nasal spray was placed in both sides of the nose.  Silastic splints were anchored to the septum with a single 4-0 nylon suture.  The posterior end of the splints was placed between the middle turbinate and lateral nasal wall bilaterally to prevent synechia.  Cylindrical nostril splints were formed out of premade bivalve splints and anchored to the septum using a single 4-0 nylon suture.  Mupirocin ointment was placed over the transcolumellar incision and the procedure was completed.  The patient returned to the [facility] in good condition having tolerated the procedure well.  There were no apparent complications and blood loss was approximately 15 mL.  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  Time of the procedure was approximately 4 hours.         ORI LIM MD             D: 02/10/2017 14:15   T: 2017 04:37   MT: PHILLIP#[id number]      Name:     ABIDA DON   MRN:      [id number]        Account:        [id number]   :      1945           Procedure Date: 2017      Document: [id number]

## 2017-05-20 ENCOUNTER — HOSPITAL ENCOUNTER (OUTPATIENT)
Facility: CLINIC | Age: 72
Setting detail: OBSERVATION
Discharge: HOME | End: 2017-05-21
Attending: EMERGENCY MEDICINE | Admitting: INTERNAL MEDICINE
Payer: MEDICARE

## 2017-05-20 ENCOUNTER — APPOINTMENT (OUTPATIENT)
Dept: CT IMAGING | Facility: CLINIC | Age: 72
End: 2017-05-20
Attending: EMERGENCY MEDICINE
Payer: MEDICARE

## 2017-05-20 DIAGNOSIS — Q62.11 HYDRONEPHROSIS WITH URETEROPELVIC JUNCTION (UPJ) OBSTRUCTION: Primary | ICD-10-CM

## 2017-05-20 DIAGNOSIS — N20.0 KIDNEY STONE: ICD-10-CM

## 2017-05-20 DIAGNOSIS — R11.2 NON-INTRACTABLE VOMITING WITH NAUSEA, UNSPECIFIED VOMITING TYPE: ICD-10-CM

## 2017-05-20 LAB
ALBUMIN UR-MCNC: NEGATIVE MG/DL
AMORPH CRY #/AREA URNS HPF: ABNORMAL /HPF
ANION GAP SERPL CALCULATED.3IONS-SCNC: 6 MMOL/L (ref 3–14)
APPEARANCE UR: CLEAR
BILIRUB UR QL STRIP: NEGATIVE
BUN SERPL-MCNC: 15 MG/DL (ref 7–30)
CALCIUM SERPL-MCNC: 8.2 MG/DL (ref 8.5–10.1)
CHLORIDE SERPL-SCNC: 104 MMOL/L (ref 94–109)
CO2 SERPL-SCNC: 30 MMOL/L (ref 20–32)
COLOR UR AUTO: YELLOW
CREAT SERPL-MCNC: 0.73 MG/DL (ref 0.52–1.04)
GFR SERPL CREATININE-BSD FRML MDRD: 78 ML/MIN/1.7M2
GLUCOSE SERPL-MCNC: 171 MG/DL (ref 70–99)
GLUCOSE UR STRIP-MCNC: NEGATIVE MG/DL
HGB UR QL STRIP: ABNORMAL
KETONES UR STRIP-MCNC: NEGATIVE MG/DL
LEUKOCYTE ESTERASE UR QL STRIP: NEGATIVE
NITRATE UR QL: NEGATIVE
PH UR STRIP: 7 PH (ref 5–7)
POTASSIUM SERPL-SCNC: 3.5 MMOL/L (ref 3.4–5.3)
RBC #/AREA URNS AUTO: ABNORMAL /HPF (ref 0–2)
SODIUM SERPL-SCNC: 140 MMOL/L (ref 133–144)
SP GR UR STRIP: 1.02 (ref 1–1.03)
URN SPEC COLLECT METH UR: ABNORMAL
UROBILINOGEN UR STRIP-ACNC: 0.2 EU/DL (ref 0.2–1)
WBC #/AREA URNS AUTO: ABNORMAL /HPF (ref 0–2)

## 2017-05-20 PROCEDURE — 99285 EMERGENCY DEPT VISIT HI MDM: CPT | Mod: 25

## 2017-05-20 PROCEDURE — G0378 HOSPITAL OBSERVATION PER HR: HCPCS

## 2017-05-20 PROCEDURE — 96376 TX/PRO/DX INJ SAME DRUG ADON: CPT

## 2017-05-20 PROCEDURE — 96361 HYDRATE IV INFUSION ADD-ON: CPT

## 2017-05-20 PROCEDURE — 81001 URINALYSIS AUTO W/SCOPE: CPT | Performed by: EMERGENCY MEDICINE

## 2017-05-20 PROCEDURE — 25000128 H RX IP 250 OP 636: Performed by: EMERGENCY MEDICINE

## 2017-05-20 PROCEDURE — 96374 THER/PROPH/DIAG INJ IV PUSH: CPT

## 2017-05-20 PROCEDURE — 25000128 H RX IP 250 OP 636: Performed by: INTERNAL MEDICINE

## 2017-05-20 PROCEDURE — 83036 HEMOGLOBIN GLYCOSYLATED A1C: CPT | Performed by: EMERGENCY MEDICINE

## 2017-05-20 PROCEDURE — 80048 BASIC METABOLIC PNL TOTAL CA: CPT | Performed by: EMERGENCY MEDICINE

## 2017-05-20 PROCEDURE — 99220 ZZC INITIAL OBSERVATION CARE,LEVL III: CPT | Performed by: INTERNAL MEDICINE

## 2017-05-20 PROCEDURE — 96375 TX/PRO/DX INJ NEW DRUG ADDON: CPT

## 2017-05-20 PROCEDURE — 74176 CT ABD & PELVIS W/O CONTRAST: CPT

## 2017-05-20 RX ORDER — TAMSULOSIN HYDROCHLORIDE 0.4 MG/1
0.4 CAPSULE ORAL DAILY
Status: DISCONTINUED | OUTPATIENT
Start: 2017-05-21 | End: 2017-05-21 | Stop reason: HOSPADM

## 2017-05-20 RX ORDER — ONDANSETRON 2 MG/ML
4 INJECTION INTRAMUSCULAR; INTRAVENOUS EVERY 4 HOURS PRN
Status: DISCONTINUED | OUTPATIENT
Start: 2017-05-20 | End: 2017-05-21 | Stop reason: HOSPADM

## 2017-05-20 RX ORDER — HYDROMORPHONE HYDROCHLORIDE 1 MG/ML
0.5 INJECTION, SOLUTION INTRAMUSCULAR; INTRAVENOUS; SUBCUTANEOUS
Status: DISCONTINUED | OUTPATIENT
Start: 2017-05-20 | End: 2017-05-20

## 2017-05-20 RX ORDER — SODIUM CHLORIDE 9 MG/ML
1000 INJECTION, SOLUTION INTRAVENOUS CONTINUOUS
Status: DISCONTINUED | OUTPATIENT
Start: 2017-05-20 | End: 2017-05-20

## 2017-05-20 RX ORDER — DIPHENHYDRAMINE HYDROCHLORIDE 50 MG/ML
25 INJECTION INTRAMUSCULAR; INTRAVENOUS EVERY 6 HOURS PRN
Status: DISCONTINUED | OUTPATIENT
Start: 2017-05-20 | End: 2017-05-21 | Stop reason: HOSPADM

## 2017-05-20 RX ORDER — KETOROLAC TROMETHAMINE 15 MG/ML
15 INJECTION, SOLUTION INTRAMUSCULAR; INTRAVENOUS EVERY 6 HOURS PRN
Status: DISCONTINUED | OUTPATIENT
Start: 2017-05-21 | End: 2017-05-21 | Stop reason: HOSPADM

## 2017-05-20 RX ORDER — KETOROLAC TROMETHAMINE 30 MG/ML
30 INJECTION, SOLUTION INTRAMUSCULAR; INTRAVENOUS ONCE
Status: COMPLETED | OUTPATIENT
Start: 2017-05-20 | End: 2017-05-20

## 2017-05-20 RX ORDER — NALOXONE HYDROCHLORIDE 0.4 MG/ML
.1-.4 INJECTION, SOLUTION INTRAMUSCULAR; INTRAVENOUS; SUBCUTANEOUS
Status: DISCONTINUED | OUTPATIENT
Start: 2017-05-20 | End: 2017-05-21 | Stop reason: HOSPADM

## 2017-05-20 RX ORDER — ONDANSETRON 2 MG/ML
4 INJECTION INTRAMUSCULAR; INTRAVENOUS EVERY 30 MIN PRN
Status: DISCONTINUED | OUTPATIENT
Start: 2017-05-20 | End: 2017-05-20

## 2017-05-20 RX ORDER — SODIUM CHLORIDE AND POTASSIUM CHLORIDE 150; 900 MG/100ML; MG/100ML
INJECTION, SOLUTION INTRAVENOUS CONTINUOUS
Status: DISCONTINUED | OUTPATIENT
Start: 2017-05-20 | End: 2017-05-21 | Stop reason: HOSPADM

## 2017-05-20 RX ORDER — POLYETHYLENE GLYCOL 3350 17 G/17G
POWDER, FOR SOLUTION ORAL
COMMUNITY

## 2017-05-20 RX ORDER — ONDANSETRON 2 MG/ML
4 INJECTION INTRAMUSCULAR; INTRAVENOUS ONCE
Status: DISCONTINUED | OUTPATIENT
Start: 2017-05-20 | End: 2017-05-20

## 2017-05-20 RX ORDER — ALBUTEROL SULFATE 90 UG/1
2 AEROSOL, METERED RESPIRATORY (INHALATION) EVERY 6 HOURS PRN
Status: DISCONTINUED | OUTPATIENT
Start: 2017-05-20 | End: 2017-05-21 | Stop reason: HOSPADM

## 2017-05-20 RX ORDER — HYDROMORPHONE HYDROCHLORIDE 1 MG/ML
.3-.5 INJECTION, SOLUTION INTRAMUSCULAR; INTRAVENOUS; SUBCUTANEOUS
Status: DISCONTINUED | OUTPATIENT
Start: 2017-05-20 | End: 2017-05-21 | Stop reason: HOSPADM

## 2017-05-20 RX ORDER — DIPHENHYDRAMINE HCL 25 MG
25 CAPSULE ORAL EVERY 6 HOURS PRN
Status: DISCONTINUED | OUTPATIENT
Start: 2017-05-20 | End: 2017-05-21 | Stop reason: HOSPADM

## 2017-05-20 RX ADMIN — ONDANSETRON 4 MG: 2 SOLUTION INTRAMUSCULAR; INTRAVENOUS at 23:44

## 2017-05-20 RX ADMIN — SODIUM CHLORIDE 1000 ML: 9 INJECTION, SOLUTION INTRAVENOUS at 21:57

## 2017-05-20 RX ADMIN — ONDANSETRON 4 MG: 2 SOLUTION INTRAMUSCULAR; INTRAVENOUS at 21:57

## 2017-05-20 RX ADMIN — KETOROLAC TROMETHAMINE 30 MG: 30 INJECTION, SOLUTION INTRAMUSCULAR at 19:30

## 2017-05-20 RX ADMIN — ONDANSETRON 4 MG: 2 SOLUTION INTRAMUSCULAR; INTRAVENOUS at 19:30

## 2017-05-20 RX ADMIN — SODIUM CHLORIDE 1000 ML: 9 INJECTION, SOLUTION INTRAVENOUS at 19:32

## 2017-05-20 RX ADMIN — SODIUM CHLORIDE 1000 ML: 9 INJECTION, SOLUTION INTRAVENOUS at 23:26

## 2017-05-20 RX ADMIN — HYDROMORPHONE HYDROCHLORIDE 0.5 MG: 1 INJECTION, SOLUTION INTRAMUSCULAR; INTRAVENOUS; SUBCUTANEOUS at 23:44

## 2017-05-20 RX ADMIN — HYDROMORPHONE HYDROCHLORIDE 0.5 MG: 1 INJECTION, SOLUTION INTRAMUSCULAR; INTRAVENOUS; SUBCUTANEOUS at 20:16

## 2017-05-20 RX ADMIN — HYDROMORPHONE HYDROCHLORIDE 0.5 MG: 1 INJECTION, SOLUTION INTRAMUSCULAR; INTRAVENOUS; SUBCUTANEOUS at 22:33

## 2017-05-20 RX ADMIN — HYDROMORPHONE HYDROCHLORIDE 0.5 MG: 1 INJECTION, SOLUTION INTRAMUSCULAR; INTRAVENOUS; SUBCUTANEOUS at 19:57

## 2017-05-20 ASSESSMENT — ENCOUNTER SYMPTOMS
CONSTIPATION: 0
VOMITING: 1
NAUSEA: 1
DIARRHEA: 0
CHILLS: 0
HEMATURIA: 0
FLANK PAIN: 1
FEVER: 0

## 2017-05-20 NOTE — IP AVS SNAPSHOT
[facility]    [address] [place] [id number]    Phone:  [phone]                                       After Visit Summary   5/20/2017    Lori Mane    MRN: [id number]           After Visit Summary Signature Page     I have received my discharge instructions, and my questions have been answered. I have discussed any challenges I see with this plan with the nurse or doctor.    ..........................................................................................................................................  Patient/Patient Representative Signature      ..........................................................................................................................................  Patient Representative Print Name and Relationship to Patient    ..................................................               ................................................  Date                                            Time    ..........................................................................................................................................  Reviewed by Signature/Title    ...................................................              ..............................................  Date                                                            Time

## 2017-05-20 NOTE — IP AVS SNAPSHOT
MRN:[id number]                      After Visit Summary   5/20/2017    Lori Mane    MRN: [id number]           Thank you!     Thank you for choosing [facility] for your care. Our goal is always to provide you with excellent care. Hearing back from our patients is one way we can continue to improve our services. Please take a few minutes to complete the written survey that you may receive in the mail after you visit with us. Thank you!        Patient Information     Date Of Birth          1945        About your hospital stay     You were admitted on:  May 20, 2017 [facility]    You were discharged on:  May 21, 2017        Reason for your hospital stay       You were hospitalized for further evaluation and treatment of a right-sided kidney stone.                  Who to Call     For medical emergencies, please call 911.  For non-urgent questions about your medical care, please call your primary care provider or clinic, [phone]          Attending Provider     Provider Specialty    Ezio Lima DO Emergency Medicine    Taylor Monroy MD Internal Medicine       Primary Care Provider Office Phone # Fax #    Gregory Sheriff [phone]       [place] [address]        After Care Instructions     Activity       Your activity upon discharge: activity as tolerated            Diet       Follow this diet upon discharge: Regular Diet Adult            Discharge Instructions       1) Follow-up with PCP in the next week to discuss hospitalization   2) Follow-up with Urology (Dr. Becerra) as needed if stone does not pass   3) Prescription for Flomax sent at discharge; take as instructed   4) Pain control with OTC Aleve, as needed Oxycodone for moderate to severe pain, prescription sent at discharge   5) Return to ED with severe pain, fevers, or change/recurrence of symptoms.       "            Follow-up Appointments     Follow-up and recommended labs and tests        Follow up with primary care provider, Gregory Sheriff, within 7 days for hospital follow- up.  No follow up labs or test are needed.    Follow up with Dr. Becerra of Urology in the next week if stone hasn't passed.                  Pending Results     No orders found for last 3 day(s).            Statement of Approval     Ordered          17 1058  I have reviewed and agree with all the recommendations and orders detailed in this document.  EFFECTIVE NOW     Approved and electronically signed by:  Amanda Marquis PA-C             Admission Information     Date & Time Provider Department Dept. Phone    2017 Taylor Monroy MD [facility] [phone]      Your Vitals Were     Blood Pressure Pulse Temperature Respirations Height Weight    114/55 (BP Location: Left arm) 53 97  F (36.1  C) (Oral) 16 1.803 m (5' 11\") 62.7 kg (138 lb 4.8 oz)    Pulse Oximetry BMI (Body Mass Index)                93% 19.29 kg/m2          [email or web address] Information     [email or web address] lets you send messages to your doctor, view your test results, renew your prescriptions, schedule appointments and more. To sign up, go to [email or web address] . Click on \"Log in\" on the left side of the screen, which will take you to the Welcome page. Then click on \"Sign up Now\" on the right side of the page.     You will be asked to enter the access code listed below, as well as some personal information. Please follow the directions to create your username and password.     Your access code is: [email or web address]  Expires: 2017 11:28 AM     Your access code will  in 90 days. If you need help or a new code, please call your [facility] clinic or [phone].        Care EveryWhere ID     This is your Care EveryWhere ID. This could be used by other organizations to access your [facility] medical records  [id number]           Review of your medicines "      START taking        Dose / Directions    ondansetron 4 MG tablet   Commonly known as:  ZOFRAN   Used for:  Hydronephrosis with ureteropelvic junction (UPJ) obstruction   Notes to Patient:  Take as needed        Dose:  4 mg   Take 1 tablet (4 mg) by mouth every 8 hours as needed for nausea   Quantity:  18 tablet   Refills:  0       oxyCODONE 5 MG IR tablet   Commonly known as:  ROXICODONE   Used for:  Hydronephrosis with ureteropelvic junction (UPJ) obstruction   Notes to Patient:  Take as needed        Dose:  5 mg   Take 1 tablet (5 mg) by mouth every 4 hours as needed for moderate to severe pain   Quantity:  15 tablet   Refills:  0       tamsulosin 0.4 MG capsule   Commonly known as:  FLOMAX   Used for:  Hydronephrosis with ureteropelvic junction (UPJ) obstruction        Dose:  0.4 mg   Take 1 capsule (0.4 mg) by mouth daily   Quantity:  30 capsule   Refills:  0         CONTINUE these medicines which have NOT CHANGED        Dose / Directions    albuterol 108 (90 BASE) MCG/ACT Inhaler   Commonly known as:  PROAIR HFA/PROVENTIL HFA/VENTOLIN HFA   Notes to Patient:  Resume at discharge        Dose:  2 puff   Inhale 2 puffs into the lungs every 6 hours.   Refills:  0       estrogen (conjugated)-medroxyPROGESTERone 0.3-1.5 MG per tablet   Commonly known as:  PREMPRO   Notes to Patient:  Resume at discharge          Dose:  1 tablet   Take 1 tablet by mouth daily   Refills:  0       glucosamine-chondroitin 500-400 MG Caps per capsule   Notes to Patient:  Resume at discharge          Dose:  1 capsule   Take 1 capsule by mouth daily.   Refills:  0       MULTIVITAMIN ADULT PO   Notes to Patient:  Resume at discharge          Dose:  1 chew tab   Take 1 chew tab by mouth daily   Refills:  0       NAPROXEN PO   Notes to Patient:  Take as needed        Dose:  220 mg   Take 220 mg by mouth as needed for moderate pain   Refills:  0       polyethylene glycol Packet   Commonly known as:  MIRALAX/GLYCOLAX   Notes to Patient:   Take as needed        Use 2 scoops by mouth daily in the morning as needed   Refills:  0            Where to get your medicines      These medications were sent to [facility] [place], [place] - [address]  [address] [place] [address]     Phone:  [phone]     ondansetron 4 MG tablet    tamsulosin 0.4 MG capsule         Some of these will need a paper prescription and others can be bought over the counter. Ask your nurse if you have questions.     Bring a paper prescription for each of these medications     oxyCODONE 5 MG IR tablet                Protect others around you: Learn how to safely use, store and throw away your medicines at www.disposemymeds.org.             Medication List: This is a list of all your medications and when to take them. Check marks below indicate your daily home schedule. Keep this list as a reference.      Medications           Morning Afternoon Evening Bedtime As Needed    albuterol 108 (90 BASE) MCG/ACT Inhaler   Commonly known as:  PROAIR HFA/PROVENTIL HFA/VENTOLIN HFA   Inhale 2 puffs into the lungs every 6 hours.   Notes to Patient:  Resume at discharge                                estrogen (conjugated)-medroxyPROGESTERone 0.3-1.5 MG per tablet   Commonly known as:  PREMPRO   Take 1 tablet by mouth daily   Notes to Patient:  Resume at discharge                                  glucosamine-chondroitin 500-400 MG Caps per capsule   Take 1 capsule by mouth daily.   Notes to Patient:  Resume at discharge                                  MULTIVITAMIN ADULT PO   Take 1 chew tab by mouth daily   Notes to Patient:  Resume at discharge                                  NAPROXEN PO   Take 220 mg by mouth as needed for moderate pain   Notes to Patient:  Take as needed                                   ondansetron 4 MG tablet   Commonly known as:  ZOFRAN   Take 1 tablet (4 mg) by mouth every 8 hours as needed for nausea   Notes to Patient:  Take as needed                                    oxyCODONE 5 MG IR tablet   Commonly known as:  ROXICODONE   Take 1 tablet (5 mg) by mouth every 4 hours as needed for moderate to severe pain   Notes to Patient:  Take as needed                                   polyethylene glycol Packet   Commonly known as:  MIRALAX/GLYCOLAX   Use 2 scoops by mouth daily in the morning as needed   Notes to Patient:  Take as needed                                   tamsulosin 0.4 MG capsule   Commonly known as:  FLOMAX   Take 1 capsule (0.4 mg) by mouth daily   Last time this was given:  0.4 mg on 5/21/2017 11:03 AM   Next Dose Due:  5/22/17

## 2017-05-21 VITALS
BODY MASS INDEX: 19.36 KG/M2 | DIASTOLIC BLOOD PRESSURE: 55 MMHG | RESPIRATION RATE: 16 BRPM | HEIGHT: 71 IN | SYSTOLIC BLOOD PRESSURE: 114 MMHG | HEART RATE: 53 BPM | WEIGHT: 138.3 LBS | TEMPERATURE: 97 F | OXYGEN SATURATION: 93 %

## 2017-05-21 LAB
ANION GAP SERPL CALCULATED.3IONS-SCNC: 6 MMOL/L (ref 3–14)
BASOPHILS # BLD AUTO: 0 10E9/L (ref 0–0.2)
BASOPHILS NFR BLD AUTO: 0.2 %
BUN SERPL-MCNC: 10 MG/DL (ref 7–30)
CALCIUM SERPL-MCNC: 7.6 MG/DL (ref 8.5–10.1)
CHLORIDE SERPL-SCNC: 110 MMOL/L (ref 94–109)
CO2 SERPL-SCNC: 26 MMOL/L (ref 20–32)
CREAT SERPL-MCNC: 0.74 MG/DL (ref 0.52–1.04)
DIFFERENTIAL METHOD BLD: ABNORMAL
EOSINOPHIL # BLD AUTO: 0.1 10E9/L (ref 0–0.7)
EOSINOPHIL NFR BLD AUTO: 1.3 %
ERYTHROCYTE [DISTWIDTH] IN BLOOD BY AUTOMATED COUNT: 13.5 % (ref 10–15)
GFR SERPL CREATININE-BSD FRML MDRD: 77 ML/MIN/1.7M2
GLUCOSE SERPL-MCNC: 94 MG/DL (ref 70–99)
HBA1C MFR BLD: 5.6 % (ref 4.3–6)
HCT VFR BLD AUTO: 33.1 % (ref 35–47)
HGB BLD-MCNC: 11.1 G/DL (ref 11.7–15.7)
IMM GRANULOCYTES # BLD: 0 10E9/L (ref 0–0.4)
IMM GRANULOCYTES NFR BLD: 0.2 %
LYMPHOCYTES # BLD AUTO: 1.5 10E9/L (ref 0.8–5.3)
LYMPHOCYTES NFR BLD AUTO: 31.4 %
MCH RBC QN AUTO: 30.2 PG (ref 26.5–33)
MCHC RBC AUTO-ENTMCNC: 33.5 G/DL (ref 31.5–36.5)
MCV RBC AUTO: 90 FL (ref 78–100)
MONOCYTES # BLD AUTO: 0.3 10E9/L (ref 0–1.3)
MONOCYTES NFR BLD AUTO: 7.3 %
NEUTROPHILS # BLD AUTO: 2.8 10E9/L (ref 1.6–8.3)
NEUTROPHILS NFR BLD AUTO: 59.6 %
NRBC # BLD AUTO: 0 10*3/UL
NRBC BLD AUTO-RTO: 0 /100
PLATELET # BLD AUTO: 160 10E9/L (ref 150–450)
POTASSIUM SERPL-SCNC: 4 MMOL/L (ref 3.4–5.3)
RBC # BLD AUTO: 3.67 10E12/L (ref 3.8–5.2)
SODIUM SERPL-SCNC: 142 MMOL/L (ref 133–144)
WBC # BLD AUTO: 4.7 10E9/L (ref 4–11)

## 2017-05-21 PROCEDURE — 96376 TX/PRO/DX INJ SAME DRUG ADON: CPT

## 2017-05-21 PROCEDURE — 25000128 H RX IP 250 OP 636: Performed by: INTERNAL MEDICINE

## 2017-05-21 PROCEDURE — 99217 ZZC OBSERVATION CARE DISCHARGE: CPT | Performed by: PHYSICIAN ASSISTANT

## 2017-05-21 PROCEDURE — 25000132 ZZH RX MED GY IP 250 OP 250 PS 637: Mod: GY | Performed by: INTERNAL MEDICINE

## 2017-05-21 PROCEDURE — G0378 HOSPITAL OBSERVATION PER HR: HCPCS

## 2017-05-21 PROCEDURE — 36415 COLL VENOUS BLD VENIPUNCTURE: CPT | Performed by: PHYSICIAN ASSISTANT

## 2017-05-21 PROCEDURE — 96361 HYDRATE IV INFUSION ADD-ON: CPT

## 2017-05-21 PROCEDURE — 85025 COMPLETE CBC W/AUTO DIFF WBC: CPT | Performed by: PHYSICIAN ASSISTANT

## 2017-05-21 PROCEDURE — 80048 BASIC METABOLIC PNL TOTAL CA: CPT | Performed by: PHYSICIAN ASSISTANT

## 2017-05-21 PROCEDURE — 25000125 ZZHC RX 250: Performed by: INTERNAL MEDICINE

## 2017-05-21 RX ORDER — OXYCODONE HYDROCHLORIDE 5 MG/1
5 TABLET ORAL EVERY 4 HOURS PRN
Qty: 15 TABLET | Refills: 0 | Status: SHIPPED | OUTPATIENT
Start: 2017-05-21

## 2017-05-21 RX ORDER — TAMSULOSIN HYDROCHLORIDE 0.4 MG/1
0.4 CAPSULE ORAL DAILY
Qty: 30 CAPSULE | Refills: 0 | Status: SHIPPED | OUTPATIENT
Start: 2017-05-21

## 2017-05-21 RX ORDER — OXYCODONE HYDROCHLORIDE 5 MG/1
5 TABLET ORAL EVERY 4 HOURS PRN
Qty: 15 TABLET | Refills: 0 | Status: SHIPPED | OUTPATIENT
Start: 2017-05-21 | End: 2017-05-21

## 2017-05-21 RX ORDER — TAMSULOSIN HYDROCHLORIDE 0.4 MG/1
0.4 CAPSULE ORAL DAILY
Qty: 30 CAPSULE | Refills: 0 | Status: SHIPPED | OUTPATIENT
Start: 2017-05-21 | End: 2017-05-21

## 2017-05-21 RX ORDER — ONDANSETRON 4 MG/1
4 TABLET, FILM COATED ORAL EVERY 8 HOURS PRN
Qty: 18 TABLET | Refills: 0 | Status: SHIPPED | OUTPATIENT
Start: 2017-05-21

## 2017-05-21 RX ADMIN — HYDROMORPHONE HYDROCHLORIDE 0.5 MG: 1 INJECTION, SOLUTION INTRAMUSCULAR; INTRAVENOUS; SUBCUTANEOUS at 01:53

## 2017-05-21 RX ADMIN — KETOROLAC TROMETHAMINE 15 MG: 15 INJECTION, SOLUTION INTRAMUSCULAR; INTRAVENOUS at 03:01

## 2017-05-21 RX ADMIN — HYDROMORPHONE HYDROCHLORIDE 0.5 MG: 1 INJECTION, SOLUTION INTRAMUSCULAR; INTRAVENOUS; SUBCUTANEOUS at 03:02

## 2017-05-21 RX ADMIN — POTASSIUM CHLORIDE AND SODIUM CHLORIDE: 900; 150 INJECTION, SOLUTION INTRAVENOUS at 01:53

## 2017-05-21 RX ADMIN — HYDROMORPHONE HYDROCHLORIDE 0.5 MG: 1 INJECTION, SOLUTION INTRAMUSCULAR; INTRAVENOUS; SUBCUTANEOUS at 00:57

## 2017-05-21 NOTE — PHARMACY-ADMISSION MEDICATION HISTORY
Admission medication history interview status for the 5/20/2017  admission is complete. See EPIC admission navigator for prior to admission medications     Medication history source reliability:Good    Actions taken by pharmacist (provider contacted, etc):None     Additional medication history information not noted on PTA med list :    -Removed B complex, calcium, percocet, and cephalexin from med list   -Changed MVM from 2 chews to 1 chew tab daily     Medication reconciliation/reorder completed by provider prior to medication history? No    Time spent in this activity: 30 mins     Prior to Admission medications    Medication Sig Last Dose Taking? Auth Provider   Multiple Vitamins-Minerals (MULTIVITAMIN ADULT PO) Take 1 chew tab by mouth daily  5/20/2017 at Unknown time Yes Reported, Patient   NAPROXEN PO Take 220 mg by mouth as needed for moderate pain Past Week at prn Yes Reported, Patient   estrogen, conjugated,-medroxyPROGESTERone (PREMPRO) 0.3-1.5 MG per tablet Take 1 tablet by mouth daily 5/20/2017 at Unknown time Yes Reported, Patient   albuterol (PROVENTIL HFA: VENTOLIN HFA) 108 (90 BASE) MCG/ACT inhaler Inhale 2 puffs into the lungs every 6 hours.  at prn Yes Reported, Patient   glucosamine-chondroitin 500-400 MG CAPS Take 1 capsule by mouth daily.   5/20/2017 at Unknown time Yes Reported, Patient   MIRALAX OR PACK Use 2 scoops by mouth daily in the morning as needed  at prn Yes

## 2017-05-21 NOTE — PLAN OF CARE
Problem: Discharge Planning  Goal: Discharge Planning (Adult, OB, Behavioral, Peds)  Outcome: Adequate for Discharge Date Met:  05/21/17  Pt a/o x 4. VSS. Denies pain this morning. Voiding fine but still not passing the stone. Set to see urology outpatient for further care. Discharge instructions given. Medications given. All questions answered, all belongings sent with pt.

## 2017-05-21 NOTE — DISCHARGE SUMMARY
[facility]    Discharge Summary  Hospitalist    Date of Admission:  5/20/2017  Date of Discharge:  5/21/2017  Discharging Provider: Amanda Marquis PA-C  Date of Service (when I saw the patient): 05/21/17    Discharge Diagnoses   4 mm right UPJ stone with associated hydronephrosis   Intractable nausea and vomiting, resolved     History of Present Illness   A 72-year-old female with PMHx of asthma, spondylosis, and meniere's disease presenting with flank pain found to have a 4 mm UPJ stone with associated right hydronephrosis. Admitted to the observation unit for further evaluation and treatment. Vitals currently WNL. Pt currently stable. See H&P by Dr. Monroy from 5/20/17 for complete details on presentation.     Pt's pain much improved this AM. No N/V. Ran. Urology saw and plans for discharge with OP follow-up if stone is not passed in next week. Afebrile. No acute      Hospital Course   Lori Mane was admitted on 5/20/2017.  The following problems were addressed during her hospitalization:    4 mm right ureteropelvic junction stone with hydronephrosis: This is patient's first episode of nephrolithiasis. CT confirming 4 mm UPJ stone with associated right-sided hydronephrosis. CBC and BMP unremarkable. UA negative.   -- Follow-up with PCP in the next week to discuss hospitalization  -- Follow-up with Urology if stone has not passed in the next week for OP procedure   -- Sent with prescriptions for Flomax and Zofran; take as instructed   -- Pain control with Aleve and PRN Oxycodone; prescription for oxycodone sent at discharge, take as instructed   -- Report to ED with worsening symptoms  -- Adequate oral fluid intake encouraged       Asthma: PRN albuterol      Amanda Marquis PA-C    This patient was discussed with Dr. Smith of the Hospitalist Service who agrees with current plans as outlined above.     Significant Results and Procedures   See below     Pending Results    These results will be followed up by PCP   Unresulted Labs Ordered in the Past 30 Days of this Admission     No orders found for last 61 day(s).          Code Status   Full Code       Primary Care Physician   Gregory Sheriff          Discharge Disposition   Discharged to home  Condition at discharge: Stable    Consultations This Hospital Stay   UROLOGY IP CONSULT    Time Spent on this Encounter   I, Amanda Marquis, personally saw the patient today and spent less than or equal to 30 minutes discharging this patient.    Discharge Orders     Reason for your hospital stay   You were hospitalized for further evaluation and treatment of a right-sided kidney stone.     Follow-up and recommended labs and tests    Follow up with primary care provider, Gregory Sheriff, within 7 days for hospital follow- up.  No follow up labs or test are needed.    Follow up with Dr. Becerra of Urology in the next week if stone hasn't passed.     Activity   Your activity upon discharge: activity as tolerated     Discharge Instructions   1) Follow-up with PCP in the next week to discuss hospitalization   2) Follow-up with Urology (Dr. Becerra) as needed if stone does not pass   3) Prescription for Flomax sent at discharge; take as instructed   4) Pain control with OTC Aleve, as needed Oxycodone for moderate to severe pain, prescription sent at discharge   5) Return to ED with severe pain, fevers, or change/recurrence of symptoms.     Full Code     Diet   Follow this diet upon discharge: Regular Diet Adult       Discharge Medications   Current Discharge Medication List      START taking these medications    Details   tamsulosin (FLOMAX) 0.4 MG capsule Take 1 capsule (0.4 mg) by mouth daily  Qty: 30 capsule, Refills: 0    Associated Diagnoses: Hydronephrosis with ureteropelvic junction (UPJ) obstruction      oxyCODONE (ROXICODONE) 5 MG IR tablet Take 1 tablet (5 mg) by mouth every 4 hours as needed for moderate to severe pain  Qty: 15  "tablet, Refills: 0    Associated Diagnoses: Hydronephrosis with ureteropelvic junction (UPJ) obstruction         CONTINUE these medications which have NOT CHANGED    Details   polyethylene glycol (MIRALAX/GLYCOLAX) Packet Use 2 scoops by mouth daily in the morning as needed      Multiple Vitamins-Minerals (MULTIVITAMIN ADULT PO) Take 1 chew tab by mouth daily       NAPROXEN PO Take 220 mg by mouth as needed for moderate pain      estrogen, conjugated,-medroxyPROGESTERone (PREMPRO) 0.3-1.5 MG per tablet Take 1 tablet by mouth daily      albuterol (PROVENTIL HFA: VENTOLIN HFA) 108 (90 BASE) MCG/ACT inhaler Inhale 2 puffs into the lungs every 6 hours.      glucosamine-chondroitin 500-400 MG CAPS Take 1 capsule by mouth daily.             Allergies   Allergies   Allergen Reactions     Avocado Nausea and Vomiting     Severe GI distress, \"burning stomach\"     Daypro [Oxaprozin] Hives     Sulfa Drugs Itching     Data   Most Recent 3 CBC's:  Recent Labs   Lab Test  05/21/17   0854  09/14/12   0930   WBC  4.7   --    HGB  11.1*  13.2   MCV  90   --    PLT  160   --       Most Recent 3 BMP's:  Recent Labs   Lab Test  05/21/17   0854  05/20/17   1926  09/15/12   0755   NA  142  140   --    POTASSIUM  4.0  3.5   --    CHLORIDE  110*  104   --    CO2  26  30   --    BUN  10  15   --    CR  0.74  0.73  0.75   ANIONGAP  6  6   --    SILVINO  7.6*  8.2*   --    GLC  94  171*   --      Most Recent 2 LFT's:No lab results found.  Most Recent INR's and Anticoagulation Dosing History:  Anticoagulation Dose History     There is no flowsheet data to display.        Most Recent 3 Troponin's:No lab results found.  Most Recent Cholesterol Panel:No lab results found.  Most Recent 6 Bacteria Isolates From Any Culture (See EPIC Reports for Culture Details):  Recent Labs   Lab Test  02/09/17   0826  02/09/17   0749   CULT  Light growth Normal respiratory christian  Canceled, Test credited  Incorrectly ordered by PCU/Clinic  Reordered as sinus " culture.    Light growth Normal christian     Most Recent TSH, T4 and A1c Labs:  Recent Labs   Lab Test  05/20/17   1926   A1C  5.6     Results for orders placed or performed during the hospital encounter of 05/20/17   Abd/pelvis CT no contrast - Stone Protocol    Narrative    CT ABDOMEN AND PELVIS WITHOUT CONTRAST 5/20/2017 7:45 PM     HISTORY: Right flank pain; rule out kidney stone.    COMPARISON: CT abdomen and pelvis 12/31/2008.    TECHNIQUE: Axial images are obtained from the lung bases to the  symphysis without oral or IV contrast. Coronal reformatted images are  also generated.  Radiation dose for this scan was reduced using  automated exposure control, adjustment of the mA and/or kV according  to patient size, or iterative reconstruction technique.    FINDINGS:     The lung bases are clear.    Abdomen: Prior cholecystectomy changes. There is a 0.4 cm stone which  appears to be near the right UPJ. A large right renal cyst is water  density measuring 6 cm in diameter on series 2, image 45. There is  malrotation of the right kidney and hydronephrosis due to the  suspected obstructing stone. Left kidney is unremarkable. Possible  tiny punctate stones in the collecting system on series 2, image 36.  No hydronephrosis. The bowel is normal in caliber without obstruction  or diverticulitis. Appendix is not visualized. No ascites or free  intraperitoneal air.    Pelvis: The bladder is decompressed. The uterus, adnexal regions and  rectum are unremarkable. Bone window examination demonstrates  degenerative spine changes.      Impression    IMPRESSION:  1. Obstructing right UPJ stone measuring 0.4 cm with right  hydronephrosis. No other right urinary tract calculi. Probable tiny  punctate stones in the left renal collecting system. No left  hydronephrosis.  2. Enlarging simple appearing cyst right kidney now measuring 6 cm in  diameter.    AILYN ZAMARRIPA MD

## 2017-05-21 NOTE — H&P
DATE OF ADMISSION:  05/20/2017.        PRIMARY CARE PHYSICIAN:  Gregory Sheriff MD, [facility].      CHIEF COMPLAINT:  Flank pain.      HISTORY OF PRESENT ILLNESS:  Lori Mane is a 72-year-old female with history of asthma, spondylolisthesis of the lumbar spine and diverticulitis.  She acutely developed right flank pain at around 5:00 p.m. earlier tonight.  The pain progressively worsened.  She subsequently developed nausea and vomiting.  She states she vomited too numerous times to count.  No prior similar symptoms.  She came to [facility] ED where a CT of the abdomen and pelvis without contrast revealed obstructing right UPJ stone measuring 4 mm with right hydronephrosis.  The patient's kidney function is within normal range at 0.73.  Electrolytes are within normal range as well.  Treated with a couple doses of IV Dilaudid and a dose of IV Toradol without much improvement in her pain.  She was subsequently admitted.      PAST MEDICAL HISTORY:   1.  Asthma.   2.  Spondylosis of lumbosacral joint.   3.  Skin cancer of the nose.   4.  Left leg/foot numbness and tingling.   5.  Meniere disease.   6.  Complete tear of left rotator cuff.   7.  Impingement syndrome of bilateral shoulders.   8.  Herniated disks.   9.  Diverticulosis.   10.  History of diverticulitis.   11.  Cervical spondylosis without myelopathy.   12.  Capsulitis of right shoulder.   13.  History of left acromioclavicular joint arthritis.     Past Surgical History:   Procedure Laterality Date     APPENDECTOMY       BLEPHAROPLASTY BILATERAL       CHOLECYSTECTOMY       DISCECTOMY LUMBAR POSTERIOR MICROSCOPIC ONE LEVEL  9/14/2012    Procedure: DISCECTOMY LUMBAR POSTERIOR MICROSCOPIC ONE LEVEL;  LEFT L5-S1 MICRODISCECTOMY(CONTRPAULINE MICROSCOPE, MIDAS GAUTAM AM8);  Surgeon: Roosevelt Harrison MD;  Location:  OR     ENT SURGERY      middle ear surgery for Meniers left ear     EYE SURGERY      bilat cataract surgery      FUSION CERVICAL ANTERIOR ONE LEVEL      C56     OPTICAL TRACKING SYSTEM ENDOSCOPIC SINUS SURGERY N/A 2/9/2017    Procedure: OPTICAL TRACKING SYSTEM ENDOSCOPIC SINUS SURGERY;  Surgeon: Angel Jernigan MD;  Location: [facility]     ORTHOPEDIC SURGERY      thumb surgery-right dislocated and tendon tear     ORTHOPEDIC SURGERY  6/9/16    right shoulder arthroscopic subacromial decompression and distal clavicle excision     SEPTOPLASTY N/A 2/9/2017    Procedure: SEPTOPLASTY;  Surgeon: Angel Jernigan MD;  Location: [facility]     SOFT TISSUE SURGERY      basal cell nose      ALLERGIES:  Avocado, Daypro, sulfa (itching).      OUTPATIENT MEDICATIONS:   1.  MiraLax daily p.r.n.   2.  Multivitamins 1 tablet p.o. daily.   3.  Naproxen 220 mg p.o. daily p.r.n. moderate pain.   4.  Estrogen 1 tablet p.o. daily.   5.  Albuterol inhaler 2 puffs q.6 h. p.r.n.   6.  Glucosamine chondroitin 500/400 mg, 1 capsule p.o. daily.      SOCIAL HISTORY:  Never smoker.  Occasional alcohol, no IV drug use.      FAMILY HISTORY:  Reviewed and noncontributory to patient's current admission.      REVIEW OF SYSTEMS:  Ten organ systems were checked and were all negative other than what was mentioned in HPI.      PHYSICAL EXAMINATION:   VITAL SIGNS:  Temperature is 97.9, blood pressure 159/61, heart rate 60, respirations 20, 99% saturation on room air.   GENERAL:  Alert and oriented x3, in moderate distress secondary to right flank pain.   HEENT AND NECK:  Normocephalic, atraumatic.  Pupils equal, round, reactive to light.  Neck supple.  Oropharynx clear, mucous membranes moist.   NECK:  No thyromegaly.   CARDIOVASCULAR:  Normal S1, S2, no murmurs, rubs or gallops.   LUNGS:  Clear to auscultation bilaterally.   ABDOMEN:  Soft, good bowel sounds, nontender, no rebound or guarding.   EXTREMITIES:  No cyanosis or clubbing.   LYMPHATICS:  No edema.   NEUROLOGIC:  Nonfocal.   SKIN:  No rash.   MUSCULOSKELETAL:  No calf tenderness to palpation.   PSYCHIATRIC:   Mood and affect are appropriate.      CT abdomen/pelvis without contrast personally reviewed by me;  impression:     1.  Obstructing right UPJ stone measuring 4 mm with right hydronephrosis.  No other right urinary tract calculi.  Probable tiny punctate stones in the left renal collecting system.  No left hydronephrosis.   2.  Enlarging simple appearing cyst, right kidney, now measuring 6 cm in diameter.      LABORATORY DATA:  Sodium 140, potassium 3.5, chloride 104, carbon dioxide 30, BUN 15, creatinine 0.73, glucose 170, calcium 8.2.      ASSESSMENT AND PLAN:  A 72-year-old female with her first episode of nephrolithiasis.     1.    4 mm right ureteropelvic junction stone with hydronephrosis:    This is patient's first episode of nephrolithiasis.  Will hydrate her overnight with IVF and treat her nausea with Zofran and right flank pain with IV Dilaudid and IV Toradol.  Start Flomax (has history of sulfa allergy, but only causes itching).  Benadryl for her itching ordered.  She will be NPO after midnight.  Will strain her urine overnight, but if she is unable to pass it, then Urology Service will be consulted tomorrow morning for cystoscopy, right retrograde pyelo, right ureteral stent placement and probable extraction of the stone.       2.    Deep vein thrombosis prophylaxis:   Sequentials.     3.    Asthma:   Resume PT inhaler.     4.    Hyperglycemia:   Random glucose 171.  Will check hemoglobin A1c.     5.    CODE STATUS:   Full.         GINA PRYOR MD             D: 2017 22:48   T: 2017 23:26   MT:       Name:     ABIDA DON   MRN:      [id number]        Account:      [id number]   :      1945           Admitted:     [id number]      Document: [id number]       cc: Gregory Sheriff MD

## 2017-05-21 NOTE — ED NOTES
"[facility]  ED Nurse Handoff Report    ED Chief complaint: Flank Pain (Patient started having right-sided back/flank pain this evening as well as right-sided abdominal pain; she has been nauseated and vomiting. )      ED Diagnosis:   Final diagnoses:   None       Code Status: Full Code    Allergies:   Allergies   Allergen Reactions     Avocado Nausea and Vomiting     Severe GI distress, \"burning stomach\"     Daypro [Oxaprozin] Hives     Sulfa Drugs Itching       Activity level - Baseline/Home:  Independent    Activity Level - Current:   Independent     Needed?: No    Isolation: No  Infection: Not Applicable  C-Diff (Clostridium Difficile) diagnosis    Bariatric?: No    Vital Signs:   Vitals:    05/20/17 1917 05/20/17 2028 05/20/17 2029 05/20/17 2159   BP: 159/61      Temp: 97.9  F (36.6  C)      TempSrc: Oral      SpO2: 99% (!) 87% 94% 96%   Weight: 61.2 kg (135 lb)      Height: 1.803 m (5' 11\")          Cardiac Rhythm: ,        Pain level: 0-10 Pain Scale: 10    Is this patient confused?: No    Patient Report: Initial Complaint: Right Flank Pain  Focused Assessment: Pt c/o extreme flank pain, sudden onset with vomiting and nausea. Ct showed stone. Pt was given 1mg of hydromorphone which help the pain however she needed 3L NC because her O2 sats dropped to mid 80s. MD decided to bring her into Obs for pain management and nausea control.   Tests Performed: blood and CT  Abnormal Results: Kidney Stone  Treatments provided: pain meds    Family Comments: friend at beside very supportive    OBS brochure/video discussed/provided to patient: Yes    ED Medications:   Medications   0.9% sodium chloride BOLUS (0 mLs Intravenous Stopped 5/20/17 2152)     Followed by   0.9% sodium chloride infusion (1,000 mLs Intravenous New Bag 5/20/17 2157)   ondansetron (ZOFRAN) injection 4 mg (4 mg Intravenous Given 5/20/17 2157)   HYDROmorphone (PF) (DILAUDID) injection 0.5 mg (0.5 mg Intravenous Given 5/20/17 " 2016)   ketorolac (TORADOL) injection 30 mg (30 mg Intravenous Given 5/20/17 1930)       Drips infusing?:  Yes      ED NURSE PHONE NUMBER: [phone]

## 2017-05-21 NOTE — PROGRESS NOTES
Goals:     -diagnostic tests and consults completed and resulted-partially met  -vital signs normal or at patient baseline-met  -Able to pass the 4 mm rt UPJ stone spontaneous.  If unable then she will need to be seen by Urology consult service-not met         Vss, RA. Pt is alert and oriented. Up SBA.  LS clear. BS present.  Nausea and vomiting intermittently.  zofran given.  Dilaudid given frequently for pain in abdomen.  Toradol given x1.  UO adequate.  Straining urine, no stones or sediment seen.  Urology to consult today.

## 2017-05-21 NOTE — PROGRESS NOTES
Goals:    -diagnostic tests and consults completed and resulted-partially met  -vital signs normal or at patient baseline-met  -Able to pass the 4 mm rt UPJ stone spontaneous.  If unable then she will need to be seen by Urology consult service-not met

## 2017-05-21 NOTE — CONSULTS
[facility]    Urology Consultation     Date of Admission:  5/20/2017  Date of Consult (When I saw the patient): 05/21/17    Assessment & Plan   Lori Mane is a 72 year old female who was admitted on 5/20/2017. I was asked to see the patient for rt renal colic.    Pt has 4 mm stone rt proximal ureter. Currently no pain. Pt will go home with flomax and percocet. If she does not pass the stone or has recurrence of colic, she will call my office to schedule surgery    Ferny Becerra MD    Code Status    Prior    Reason for Consult   Reason for consult: I was asked by Dr Porfirio Vazquez to evaluate this patient for ureteral colic.    Primary Care Physician   Gregory Sheriff MD    Chief Complaint   Rt renal colic    History is obtained from the patient    History of Present Illness   Lori Mane is a 72 year old female who presents with rt renal colic. Acute onset yesterday. No prior h/o urolithiasis. No hematuria or UTI. Currently fairly comfortable.    Past Medical History   I have reviewed this patient's medical history and updated it with pertinent information if needed.   Past Medical History:   Diagnosis Date     AC (acromioclavicular) joint arthritis      Acute pain of left shoulder      Adhesive capsulitis of right shoulder     early     Cervical spondylosis without myelopathy      Diverticulitis      Diverticulosis of colon      Herniated disc      Impingement syndrome of left shoulder      Impingement syndrome of right shoulder      Incomplete tear of left rotator cuff      Meniere's disease      Numbness and tingling     left leg/ foot     Skin cancer     nose     Spondylosis of lumbosacral joint      Uncomplicated asthma     flare ups usually associated with sinus infection       Past Surgical History   I have reviewed this patient's surgical history and updated it with pertinent information if needed.  Past Surgical History:   Procedure Laterality Date     APPENDECTOMY        BLEPHAROPLASTY BILATERAL       CHOLECYSTECTOMY       DISCECTOMY LUMBAR POSTERIOR MICROSCOPIC ONE LEVEL  9/14/2012    Procedure: DISCECTOMY LUMBAR POSTERIOR MICROSCOPIC ONE LEVEL;  LEFT L5-S1 MICRODISCECTOMY(CONTRAVES MICROSCOPE, MIDAS GAUTAM AM8);  Surgeon: Roosevelt Harrison MD;  Location:  OR     ENT SURGERY      middle ear surgery for Meniers left ear     EYE SURGERY      bilat cataract surgery     FUSION CERVICAL ANTERIOR ONE LEVEL      C56     OPTICAL TRACKING SYSTEM ENDOSCOPIC SINUS SURGERY N/A 2/9/2017    Procedure: OPTICAL TRACKING SYSTEM ENDOSCOPIC SINUS SURGERY;  Surgeon: Angel Jernigan MD;  Location: [facility]     ORTHOPEDIC SURGERY      thumb surgery-right dislocated and tendon tear     ORTHOPEDIC SURGERY  6/9/16    right shoulder arthroscopic subacromial decompression and distal clavicle excision     SEPTOPLASTY N/A 2/9/2017    Procedure: SEPTOPLASTY;  Surgeon: Angel Jernigan MD;  Location: [facility]     SOFT TISSUE SURGERY      basal cell nose       Prior to Admission Medications   Prior to Admission Medications   Prescriptions Last Dose Informant Patient Reported? Taking?   Multiple Vitamins-Minerals (MULTIVITAMIN ADULT PO) 5/20/2017 at Unknown time Self Yes Yes   Sig: Take 1 chew tab by mouth daily    NAPROXEN PO Past Week at prn Self Yes Yes   Sig: Take 220 mg by mouth as needed for moderate pain   albuterol (PROVENTIL HFA: VENTOLIN HFA) 108 (90 BASE) MCG/ACT inhaler  at prn Self Yes Yes   Sig: Inhale 2 puffs into the lungs every 6 hours.   estrogen, conjugated,-medroxyPROGESTERone (PREMPRO) 0.3-1.5 MG per tablet 5/20/2017 at Unknown time Self Yes Yes   Sig: Take 1 tablet by mouth daily   glucosamine-chondroitin 500-400 MG CAPS 5/20/2017 at Unknown time Self Yes Yes   Sig: Take 1 capsule by mouth daily.     polyethylene glycol (MIRALAX/GLYCOLAX) Packet  at prn Self Yes Yes   Sig: Use 2 scoops by mouth daily in the morning as needed      Facility-Administered Medications: None     Allergies  "  Allergies   Allergen Reactions     Avocado Nausea and Vomiting     Severe GI distress, \"burning stomach\"     Daypro [Oxaprozin] Hives     Sulfa Drugs Itching       Social History   I have reviewed this patient's social history and updated it with pertinent information if needed. Lori Mane  reports that she has never smoked. She does not have any smokeless tobacco history on file. She reports that she drinks about 1.2 oz of alcohol per week  She reports that she does not use illicit drugs.    Family History   I have reviewed this patient's family history and updated it with pertinent information if needed.   No family history on file.    Review of Systems   The 10 point Review of Systems is negative other than noted in the HPI or here.     Physical Exam   Temp: 97  F (36.1  C) Temp src: Oral BP: 114/55 Pulse: 53 Heart Rate: 57 Resp: 16 SpO2: 93 % O2 Device: None (Room air) Oxygen Delivery: 3 LPM  Vital Signs with Ranges  Temp:  [96.8  F (36  C)-97.9  F (36.6  C)] 97  F (36.1  C)  Pulse:  [53] 53  Heart Rate:  [57-60] 57  Resp:  [16-18] 16  BP: (114-159)/(55-61) 114/55  SpO2:  [87 %-99 %] 93 %  138 lbs 4.8 oz    Constitutional: Nl  Eyes: Nl  ENT: Nl  Respiratory: Nl  Cardiovascular: Nl  GI: Nl. No abd tenderness  Lymph/Hematologic: nl  Skin: nl  Musculoskeletal: nl  Neurologic: nl  Neuropsychiatric: nl    Data  CT scan  IMPRESSION:  1. Obstructing right UPJ stone measuring 0.4 cm with right  hydronephrosis. No other right urinary tract calculi. Probable tiny  punctate stones in the left renal collecting system. No left  hydronephrosis.  2. Enlarging simple appearing cyst right kidney now measuring 6 cm in  diameter.        "

## 2017-05-21 NOTE — PROGRESS NOTES
[facility]    Hospitalist Progress Note    Date of Service (when I saw the patient): 05/21/2017    Assessment & Plan   A 72-year-old female with PMHx of asthma, spondylosis, and meniere's disease presenting with flank pain found to have a 4 mm UPJ stone with associated right hydronephrosis. Admitted to the observation unit for further evaluation and treatment. Vitals currently WNL. Pt currently stable.     4 mm right ureteropelvic junction stone with hydronephrosis: This is patient's first episode of nephrolithiasis. CT confirming 4 mm UPJ stone with associated right-sided hydronephrosis. CBC and BMP unremarkable. UA negative.   -- IVF at 125 mL/hr   -- Flomax daily (hx of sulfa allergy, but only causes itching), benadryl PRN for itching   -- Strain urine   -- Antiemetic and analgesic regimen in place   -- [facility], appreciate assistance greatly   -- NPO in anticipation for possible procedure      Asthma: PRN albuterol     DVT Prophylaxis: Ambulate every shift  Code Status: Full Code    Disposition: Expected discharge pending Urology evaluation    Amanda Marquis PA-C    This patient was discussed with Dr. Smith of the Hospitalist Service who agrees with current plans as outlined above.     Interval History   Ongoing pain and nausea. Pain unmanaged overnight, associated nausea. Uncomfortable. Afebrile.     -Data reviewed today: I reviewed all new labs and imaging results over the last 24 hours. I personally reviewed no images or EKG's today.    Physical Exam   Temp: 97  F (36.1  C) Temp src: Oral BP: 114/55 Pulse: 53 Heart Rate: 57 Resp: 16 SpO2: 93 % O2 Device: None (Room air) Oxygen Delivery: 3 LPM  Vitals:    05/20/17 1917 05/20/17 2300   Weight: 61.2 kg (135 lb) 62.7 kg (138 lb 4.8 oz)     Vital Signs with Ranges  Temp:  [96.8  F (36  C)-97.9  F (36.6  C)] 97  F (36.1  C)  Pulse:  [53] 53  Heart Rate:  [57-60] 57  Resp:  [16-18] 16  BP: (114-159)/(55-61)  114/55  SpO2:  [87 %-99 %] 93 %  I/O last 3 completed shifts:  In: 1663 [P.O.:150; I.V.:1513]  Out: 1075 [Urine:1075]    CONSTITUTIONAL: Pt laying in bed, dressed in hospital garb. Appears in moderate discomfort. Cooperative with interview.   HEENT: Normocephalic, atraumatic. Negative for conjunctival redness or scleral icterus.  Oral mucosa pink and moist; negative for ulcerations, erythema, or exudates.    CARDIOVASCULAR: RRR, no murmurs, rubs, or extra heart sounds appreciated. Pulses +2/4 and regular in upper and lower extremities, bilaterally.   RESPIRATORY: No increased work of breathing.  CTA, bilat; no wheezes, rales, or rhonchi appreciated.  GASTROINTESTINAL:  Abdomen soft, non-distended. BS auscultated in all four quadrants. Negative for tenderness to palpation.  No masses or organomegaly noted.  MUSCULOSKELETAL: No gross deformities noted. Normal muscle tone.   HEMATOLOGIC/LYMPHATIC/IMMUNOLOGIC: Negative for lower extremity edema, bilaterally.  NEUROLOGIC: Alert and oriented to person, place, and time.  strength intact. No focal neuro deficits appreciated.   SKIN: Warm, dry, intact. No jaundice noted. Negative for suspicious lesions, rashes, bruising, open sores or abrasions.     Medications     0.9% sodium chloride + KCl 20 mEq/L 125 mL/hr at 05/21/17 0153       estrogen (conjugated)-medroxyPROGESTERone  1 tablet Oral Daily     tamsulosin  0.4 mg Oral Daily       Data     Recent Labs  Lab 05/21/17  0854 05/20/17  1926   WBC 4.7  --    HGB 11.1*  --    MCV 90  --      --     140   POTASSIUM 4.0 3.5   CHLORIDE 110* 104   CO2 26 30   BUN 10 15   CR 0.74 0.73   ANIONGAP 6 6   SILVINO 7.6* 8.2*   GLC 94 171*       Recent Results (from the past 24 hour(s))   Abd/pelvis CT no contrast - Stone Protocol    Narrative    CT ABDOMEN AND PELVIS WITHOUT CONTRAST 5/20/2017 7:45 PM     HISTORY: Right flank pain; rule out kidney stone.    COMPARISON: CT abdomen and pelvis 12/31/2008.    TECHNIQUE: Axial  images are obtained from the lung bases to the  symphysis without oral or IV contrast. Coronal reformatted images are  also generated.  Radiation dose for this scan was reduced using  automated exposure control, adjustment of the mA and/or kV according  to patient size, or iterative reconstruction technique.    FINDINGS:     The lung bases are clear.    Abdomen: Prior cholecystectomy changes. There is a 0.4 cm stone which  appears to be near the right UPJ. A large right renal cyst is water  density measuring 6 cm in diameter on series 2, image 45. There is  malrotation of the right kidney and hydronephrosis due to the  suspected obstructing stone. Left kidney is unremarkable. Possible  tiny punctate stones in the collecting system on series 2, image 36.  No hydronephrosis. The bowel is normal in caliber without obstruction  or diverticulitis. Appendix is not visualized. No ascites or free  intraperitoneal air.    Pelvis: The bladder is decompressed. The uterus, adnexal regions and  rectum are unremarkable. Bone window examination demonstrates  degenerative spine changes.      Impression    IMPRESSION:  1. Obstructing right UPJ stone measuring 0.4 cm with right  hydronephrosis. No other right urinary tract calculi. Probable tiny  punctate stones in the left renal collecting system. No left  hydronephrosis.  2. Enlarging simple appearing cyst right kidney now measuring 6 cm in  diameter.    AILYN ZAMARRIPA MD

## 2017-05-21 NOTE — ED PROVIDER NOTES
History     Chief Complaint:  Right flank pain and vomiting     HPI   Lori Mane is a 72 year old female who presents with nausea, vomiting and right-sided flank pain. This started this evening, while she was leaving for a dance recital after eating at a graduation dinner. She left at 1710 when the pain began, it worsened and nausea developed while she was at the recital. She then proceeded to have several episodes of vomiting. She reports that her pain is a constant  10/10. Patient has not taken any medication for the pain. She states she had her gall bladder and appendix removed. Patient denies hematuria, chills, fevers, chest pain, leg swelling, diarrhea and constipation.     Allergies:  Sulfa      Medications:    Percocet  Keflex  Multivitamin  Naproxen  Prempro  Albuterol  Glucosamine-chondroitin  Vitamin B  Calcium  Miralax     Past Medical History:    Herniated disc  Acromioclavicular joint arthritis  Cervical spondylosis without myelopathy   Diverticulitis  Impingement syndrome bilateral shoulders  Tear of left rotator cuff  Meniere's disease  Skin cancer  Spondylosis of lumbosacral joint  Asthma    Past Surgical History:    Appendectomy  Blepharoplasty  Cholecystectomy  Discectomy lumbar posterior   Middle ear surgery  Bilateral cataracts  Cervical fusion  Optical tracking endoscopic sinus surgery  Right shoulder arthroscopic subacromial decompression and distal clavicle excision  Septoplasty  Basal cell nose soft tissue surgery    Family History:    History reviewed. No pertinent family history.    Social History:  Marital Status: Single  Presents to the ED alone.   Tobacco Use: Never  Alcohol Use: 2 glasses of wine/week  PCP: Gregory Sheriff       Review of Systems   Constitutional: Negative for chills and fever.   Cardiovascular: Negative for chest pain and leg swelling.   Gastrointestinal: Positive for nausea and vomiting. Negative for constipation and diarrhea.   Genitourinary: Positive for  "flank pain. Negative for hematuria.   All other systems reviewed and are negative.      Physical Exam   First Vitals:  BP: 159/61  Heart Rate: 60  Temp: 97.9  F (36.6  C)  Height: 180.3 cm (5' 11\")  Weight: 61.2 kg (135 lb)  SpO2: 99 %      Physical Exam  General: Alert and cooperative with exam. Patient in moderate distress. Normal mentation.  Head:  Scalp is NC/AT  Eyes:  No scleral icterus, PERRL  ENT:  The external nose and ears are normal. The oropharynx is normal and without erythema; mucus membranes are moist. Uvula midline, no evidence of deep space infection.  Neck:  Normal range of motion without rigidity.  CV:  Regular rate and rhythm    No pathologic murmur   Resp:  Breath sounds are clear bilaterally    Non-labored, no retractions or accessory muscle use  GI:  Abdomen is soft, no distension, no tenderness. No peritoneal signs. Right sided CVA tenderness.   MS:  No lower extremity edema   Skin:  Warm and dry, No rash or lesions noted.  Neuro: Oriented x 3. No gross motor deficits.      Emergency Department Course     Imaging:  CT Abdomen and Pelvis, no contrast, stone protocol, per radiology:   IMPRESSION:  1. Obstructing right UPJ stone measuring 0.4 cm with right  hydronephrosis. No other right urinary tract calculi. Probable tiny  punctate stones in the left renal collecting system. No left  hydronephrosis.  2. Enlarging simple appearing cyst right kidney now measuring 6 cm in  diameter.    Radiographic findings were communicated with the patient who voiced understanding of the findings.    Laboratory:  BMP: Glucose 171 (H), calcium 8.2 (L), otherwise WNL (Creatinine 0.73)    Interventions:  1930: Zofran, 4 mg, IV injection  1930: Toradol, 30 mg, IV injection  1932: Normal Saline, 1 liter, IV bolus  1957: Dilaudid, 0.5 mg, IV injection     Emergency Department Course:  Nursing notes and vitals reviewed.  I performed an exam of the patient as documented above.  The above workup was undertaken.  2020: I " rechecked the patient and discussed results.  2209: I rechecked the patient and discussed results.   Findings and plan explained to the patient who consents to admission.   2219: I discussed the patient with Dr. Monroy of the hospitalist service, who will admit the patient to an obs bed for further monitoring, evaluation, and treatment.    Impression & Plan      Medical Decision Making:  Patient is a 72 year old female who presents with right flank pain. Patient's medical history and records were reviewed. Initial consideration for, but not limited to, MSK pain, kidney stone/renal colic, UTI/pyelo, among others. Labs and imaging were obtained. UA demonstrates no evidence of infection; microscopic blood noted. BMP shows normal kidney function. CT of the abd/pelvis shows right UPJ obstructing stone (0.4 cm). Patient was provided repeated doses of Zofran as well as Toradol and multiple doses of Dilaudid in the ED. Unfortunately patient continued to have associated pain. Also provided IV fluid. Given patient's persistent pain and nausea in conjunction with her age and CT findings, she will be admitted to hospital service for further observation and care. Patient remains hemodynamically stable and afebrile throughout my care.     Diagnosis:    ICD-10-CM    1. Hydronephrosis with ureteropelvic junction (UPJ) obstruction Q62.0    2. Kidney stone N20.0    3. Non-intractable vomiting with nausea, unspecified vomiting type R11.2        Disposition:   Admitted to obs.         Leeann GILLILAND, am serving as a scribe on 5/20/2017 at 7:22 PM to personally document services performed by Dr. Lima based on my observations and the provider's statements to me.    EMERGENCY DEPARTMENT       Ezio Lima,   05/20/17 7175

## 2017-12-14 ENCOUNTER — TRANSFERRED RECORDS (OUTPATIENT)
Dept: HEALTH INFORMATION MANAGEMENT | Facility: CLINIC | Age: 72
End: 2017-12-14

## 2018-02-05 NOTE — TELEPHONE ENCOUNTER
APPT INFO     Date /Time:  03/05/18 2:30pm   Reason for Appt: Consult for eyelashes growing into pt's eyes   Ref Provider/Clinic: Dr. Ousmane Mcelroy-[facility]   Internal Records    External Records    Records Requested?: YES   Done?:      ACTION  02/05/18 12:20 PM   What did you do? Faxed   Who? [facility]   Phone/Fax Number:  Phone:   [phone]    Fax: [phone]   Outcome? Faxed Medical Records Request           ACTION  02/20/18 12:40 PM   What did you do? Faxed   Who? [facility]   Phone/Fax Number:  Phone:   [phone]    Fax: [phone]   Outcome? Faxed Second Medical Records Request           Spoke with Monika at [facility], she stated that she mailed records on 02/06/18, informed her that we have not received. Requested that she send all notes from referring provider to us by Fax at [phone].  Lulu Baptiste 02/20/18 3:23 PM

## 2018-03-05 ENCOUNTER — PRE VISIT (OUTPATIENT)
Dept: OPHTHALMOLOGY | Facility: CLINIC | Age: 73
End: 2018-03-05

## 2018-03-05 ENCOUNTER — OFFICE VISIT (OUTPATIENT)
Dept: OPHTHALMOLOGY | Facility: CLINIC | Age: 73
End: 2018-03-05
Payer: COMMERCIAL

## 2018-03-05 DIAGNOSIS — H02.056 TRICHIASIS OF LEFT EYE WITHOUT ENTROPION: Primary | ICD-10-CM

## 2018-03-05 ASSESSMENT — VISUAL ACUITY
OS_SC: 20/20
OD_SC+: -2
OS_SC+: -1
OD_SC: 20/50
METHOD: SNELLEN - LINEAR

## 2018-03-05 ASSESSMENT — CONF VISUAL FIELD
OS_NORMAL: 1
OD_NORMAL: 1

## 2018-03-05 ASSESSMENT — TONOMETRY
IOP_METHOD: ICARE
OS_IOP_MMHG: 20
OD_IOP_MMHG: 19

## 2018-03-05 NOTE — LETTER
3/5/2018         RE:  :  MRN: Lori Mane  1945  [id number]     Dear Dr. Ousmane Mcelroy,    Thank you for asking me to see your patient, Lori Mane, for an oculoplastic   consultation.  My assessment and plan are below.  For further details, please see my attached clinic note.          Lori Mane is a 72 year old female with the following diagnoses:   1. 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      Sincerely,    Giovanni Zuniga MD  Department of Ophthalmology and Visual Neurosciences  [facility]    CC: Gregory Sheriff MD  [address] [address]  VIA Facsimile: [phone]     Ousmane Mcelroy MD  [facility]  [address]  [facility] [address]  VIA In Basket

## 2018-03-05 NOTE — NURSING NOTE
Chief Complaints and History of Present Illnesses   Patient presents with     Consult For     Trichiasis     HPI    Affected eye(s):  Both   Symptoms:     Blurred vision   No foreign body sensation   No itching   No burning         Do you have eye pain now?:  No      Comments:  Patient notes she has lashes that grown inwards in the LE, has had this issue for years and occurs q 2-3 months and has to have usually around 6 lashes removed.  Sleeps on the left side, and tried to wear CTL and hurt her eye trying to take them out    Micki Mendoza March 5, 2018 1:58 PM

## 2018-03-05 NOTE — MR AVS SNAPSHOT
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      Your next 10 appointments already scheduled     Mar 05, 2018  2:30 PM CST   (Arrive by 2:15 PM)   NEW PLASTICS with MD [facility] ([facility] [place])    [address]  [address] [facility] [address] [phone]              Who to contact     Please call your clinic at [phone] to:    Ask questions about your health    Make or cancel appointments    Discuss your medicines    Learn about your test results    Speak to your doctor            Additional Information About Your Visit        MyChart Information     [employer] is an electronic gateway that provides easy, online access to your medical records. With [employer], you can request a clinic appointment, read your test results, renew a prescription or communicate with your care team.     To sign up for [employer] visit the website at [email or web address]   You will be asked to enter the access code listed below, as well as some personal information. Please follow the directions to create your username and password.     Your access code is: [email or web address]  Expires: 2018  6:30 AM     Your access code will  in 90 days. If you need help or a new code, please contact your [facility] or call [phone] for assistance.        Care EveryWhere ID     This is your Care EveryWhere ID. This could be used by other organizations to access your [facility] medical records  [id number]         Blood Pressure  from Last 3 Encounters:   05/21/17 114/55   02/09/17 164/85   08/01/14 128/66    Weight from Last 3 Encounters:   05/20/17 62.7 kg (138 lb 4.8 oz)   02/09/17 59.6 kg (131 lb 8 oz)   12/05/12 60.8 kg (134 lb)              We Performed the Following     Epilation of Trichiasis, Cyro, Electric, or Laser        Primary Care Provider Office Phone # Fax #    Gregory Sheriff [phone]       [address] [address]        Equal Access to Services     [facility]: Hadii pam aguilar hadasho Soaga, waaxda luqadaha, qaybta kaalmada adeayednyamanuel, pepe marie . So [facility] [phone].    ATENCIÓN: Si habla español, tiene a ramirez disposición servicios gratuitos de asistencia lingüística. Llame al [phone].    We comply with applicable federal civil rights laws and [place] laws. We do not discriminate on the basis of race, color, national origin, age, disability, sex, sexual orientation, or gender identity.            Thank you!     Thank you for choosing [facility]  for your care. Our goal is always to provide you with excellent care. Hearing back from our patients is one way we can continue to improve our services. Please take a few minutes to complete the written survey that you may receive in the mail after your visit with us. Thank you!             Your Updated Medication List - Protect others around you: Learn how to safely use, store and throw away your medicines at www.disposemymeds.org.          This list is accurate as of 3/5/18  2:22 PM.  Always use your most recent med list.                   Brand Name Dispense Instructions for use Diagnosis    albuterol 108 (90 BASE) MCG/ACT Inhaler    PROAIR HFA/PROVENTIL HFA/VENTOLIN HFA     Inhale 2 puffs into the lungs every 6 hours.        estrogen (conjugated)-medroxyPROGESTERone 0.3-1.5 MG per tablet    PREMPRO     Take 1 tablet by mouth daily        glucosamine-chondroitin  500-400 MG Caps per capsule      Take 1 capsule by mouth daily.        MULTIVITAMIN ADULT PO      Take 1 chew tab by mouth daily        NAPROXEN PO      Take 220 mg by mouth as needed for moderate pain        ondansetron 4 MG tablet    ZOFRAN    18 tablet    Take 1 tablet (4 mg) by mouth every 8 hours as needed for nausea    Hydronephrosis with ureteropelvic junction (UPJ) obstruction       oxyCODONE IR 5 MG tablet    ROXICODONE    15 tablet    Take 1 tablet (5 mg) by mouth every 4 hours as needed for moderate to severe pain    Hydronephrosis with ureteropelvic junction (UPJ) obstruction       polyethylene glycol Packet    MIRALAX/GLYCOLAX     Use 2 scoops by mouth daily in the morning as needed        tamsulosin 0.4 MG capsule    FLOMAX    30 capsule    Take 1 capsule (0.4 mg) by mouth daily    Hydronephrosis with ureteropelvic junction (UPJ) obstruction

## 2018-03-05 NOTE — PROGRESS NOTES
Chief Complaints and History of Present Illnesses   Patient presents with     Consult For     Trichiasis     Here for evaluation of trichiasis of the left lower eyelid.  She has had it for years and gets the lashes pulled out every 3 months approximately.         Lori Mane is a 72 year old female with the following diagnoses:   1. 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  I personally obtained the chief complaint(s) and history of present illness.  I confirmed and edited as necessary the review of systems, past medical/surgical history, family history, social history, and examination findings as documented by others; and I examined the patient myself.  I personally reviewed the relevant tests, images, and reports as documented above.  I formulated and edited as necessary the assessment and plan and discussed the findings and management plan with the patient and family. I personally reviewed the ophthalmic test(s) associated with this encounter, agree with the interpretation(s) as documented by the resident/fellow, and have edited the corresponding report(s) as necessary.   -Giovanni Zuniga MD      Today with Lori Mane, I reviewed the indications, risks, benefits, and alternatives of the proposed surgical procedure including, but not limited to, failure obtain the desired result  and need for additional surgery, bleeding, infection, loss of vision, loss of the eye, and the remote possibility of permanent damage to any organ system or death with the use of anesthesia.  I provided multiple opportunities for the questions, answered all questions to the best of my  ability, and confirmed that my answers and my discussion were understood.   - Giovanni Zuniga MD 2:11 PM 3/5/2018

## 2019-04-19 ENCOUNTER — HOSPITAL LABORATORY (OUTPATIENT)
Dept: OTHER | Facility: CLINIC | Age: 74
End: 2019-04-19

## 2019-04-21 LAB
BACTERIA SPEC CULT: NORMAL
BACTERIA SPEC CULT: NORMAL
Lab: NORMAL
SPECIMEN SOURCE: NORMAL

## 2019-07-02 ENCOUNTER — OFFICE VISIT (OUTPATIENT)
Dept: URGENT CARE | Facility: URGENT CARE | Age: 74
End: 2019-07-02
Payer: MEDICARE

## 2019-07-02 ENCOUNTER — NURSE TRIAGE (OUTPATIENT)
Dept: NURSING | Facility: CLINIC | Age: 74
End: 2019-07-02

## 2019-07-02 VITALS
SYSTOLIC BLOOD PRESSURE: 122 MMHG | TEMPERATURE: 97.7 F | DIASTOLIC BLOOD PRESSURE: 84 MMHG | OXYGEN SATURATION: 97 % | HEART RATE: 67 BPM

## 2019-07-02 DIAGNOSIS — H60.391 INFECTIVE OTITIS EXTERNA, RIGHT: ICD-10-CM

## 2019-07-02 DIAGNOSIS — T16.1XXA FOREIGN BODY IN AURICLE OF RIGHT EAR, INITIAL ENCOUNTER: Primary | ICD-10-CM

## 2019-07-02 PROCEDURE — 99203 OFFICE O/P NEW LOW 30 MIN: CPT | Mod: 25 | Performed by: FAMILY MEDICINE

## 2019-07-02 PROCEDURE — 69200 CLEAR OUTER EAR CANAL: CPT | Performed by: FAMILY MEDICINE

## 2019-07-02 RX ORDER — NEOMYCIN SULFATE, POLYMYXIN B SULFATE AND HYDROCORTISONE 10; 3.5; 1 MG/ML; MG/ML; [USP'U]/ML
3 SUSPENSION/ DROPS AURICULAR (OTIC) 4 TIMES DAILY
Qty: 6 ML | Refills: 0 | Status: SHIPPED | OUTPATIENT
Start: 2019-07-02 | End: 2019-07-12

## 2019-07-02 NOTE — PROGRESS NOTES
"SUBJECTIVE:Lori Mane is a 74 year old female who presents with right ear pain and blockagefor 1 day(s).   Severity: mild and moderate   Timing:still present  Additional symptoms include none.    History of recurrent otitis: no    Past Medical History:   Diagnosis Date     AC (acromioclavicular) joint arthritis      Acute pain of left shoulder      Adhesive capsulitis of right shoulder     early     Cervical spondylosis without myelopathy      Diverticulitis      Diverticulosis of colon      Herniated disc      Impingement syndrome of left shoulder      Impingement syndrome of right shoulder      Incomplete tear of left rotator cuff      Meniere's disease      Numbness and tingling     left leg/ foot     Skin cancer     nose     Spondylosis of lumbosacral joint      Uncomplicated asthma     flare ups usually associated with sinus infection     Allergies   Allergen Reactions     Avocado Nausea and Vomiting     Severe GI distress, \"burning stomach\"     Daypro [Oxaprozin] Hives     Sulfa Drugs Itching     Social History     Tobacco Use     Smoking status: Never Smoker     Smokeless tobacco: Never Used   Substance Use Topics     Alcohol use: Yes     Alcohol/week: 1.2 oz     Types: 2 Glasses of wine per week       ROS: CONSTITUTIONAL:NEGATIVE for fever, chills, change in weight    OBJECTIVE:  /84   Pulse 67   Temp 97.7  F (36.5  C) (Tympanic)   SpO2 97%    The right TM is normal: no effusions, no erythema, and normal landmarks     The right auditory canal is erythematous, swollen, tender, FB removed with Alligator forceps by Dr. Jose Fuentes  The left TM is normal: no effusions, no erythema, and normal landmarks  The left auditory canal is normal and without drainage, edema or erythema  Oropharynx exam is normal: no lesions, erythema, adenopathy or exudate.GENERAL: no acute distress  EYES: EOMI,  PERRL, conjunctiva clear  SKIN: no suspicious lesions or rashes       ICD-10-CM    1. Foreign body in auricle of " right ear, initial encounter T16.1XXA REMOVE FB, EXT AUDITORY CANAL   2. Infective otitis externa, right H60.391 neomycin-polymyxin-hydrocortisone (CORTISPORIN) 3.5-14847-1 otic suspension       F/U PCP/IM/FP/UC if worse, not any better

## 2019-07-02 NOTE — TELEPHONE ENCOUNTER
[facility] pharmacy ([facility] [place]) requesting the following medication to be changed from suspension to solution. Pharmacy does not have the suspension form of the medication in stock.     neomycin-polymyxin-hydrocortisone (CORTISPORIN) 3.5-11450-0 otic suspension 6 mL 0 7/2/2019 7/12/2019 --   Sig - Route: Place 3 drops into the right ear 4 times daily for 10 days - Right Ear     Writer connected [facility] pharmacy to urgent care staff at [facility]. Jose Fuentes DO said it was okay to switch to solution form.     Terri Estrada RN/[facility]    Reason for Disposition    Pharmacy calling with prescription questions and triager unable to answer question    Protocols used: MEDICATION QUESTION CALL-A-

## (undated) DEVICE — Device

## (undated) DEVICE — TONGUE DEPRESSOR STERILE 6023

## (undated) DEVICE — SU ETHILON 4-0 FS-2 18" 662H

## (undated) DEVICE — SPECIMEN CULTURETTE DBL SWAB 220109

## (undated) DEVICE — SU CHROMIC 6-0 G-1 18" 790G

## (undated) DEVICE — TUBING SUCTION SOFT 20'X3/16" 0036570

## (undated) DEVICE — PACK NASAL SINUS SEN15NSFSF

## (undated) DEVICE — SOL NACL 0.9% INJ 1000ML BAG 2B1324X

## (undated) DEVICE — PAD ADHESIVE OTS ENT HEAD TRACKER 9732500

## (undated) DEVICE — SUCTION CANISTER MEDIVAC LINER 3000ML W/LID 65651-530

## (undated) DEVICE — SOL NACL 0.9% IRRIG 1000ML BOTTLE 07138-09

## (undated) DEVICE — SHEET SILICONE 55MMX75MMX1.0MM  20-10680

## (undated) DEVICE — SU PLAIN 4-0 SC-1 18" 1824H

## (undated) DEVICE — ANTIFOG SOLUTION W/FOAM PAD 31142527

## (undated) DEVICE — NDL DENTAL 27GA LONG 8881400058

## (undated) DEVICE — LINEN TOWEL PACK X5 5464

## (undated) DEVICE — NDL 25GA 1.5" 305127

## (undated) DEVICE — TRACKER ENT OTS INSTRUMENT FUSION 9733533

## (undated) DEVICE — GLOVE PROTEXIS POWDER FREE SMT 7.5  2D72PT75X

## (undated) DEVICE — SU PDS II 5-0 P-3 18" Z493G

## (undated) DEVICE — NDL SPINAL 25GA 3.5" QUINCKE 405180

## (undated) DEVICE — DRSG TEGADERM 2 1/2X 2 3/4"

## (undated) DEVICE — SU CHROMIC 4-0 G-3DA 18" 793G

## (undated) RX ORDER — GABAPENTIN 300 MG/1
CAPSULE ORAL
Status: DISPENSED
Start: 2017-02-09

## (undated) RX ORDER — FENTANYL CITRATE 50 UG/ML
INJECTION, SOLUTION INTRAMUSCULAR; INTRAVENOUS
Status: DISPENSED
Start: 2017-02-09

## (undated) RX ORDER — PROPOFOL 10 MG/ML
INJECTION, EMULSION INTRAVENOUS
Status: DISPENSED
Start: 2017-02-09

## (undated) RX ORDER — OXYCODONE AND ACETAMINOPHEN 5; 325 MG/1; MG/1
TABLET ORAL
Status: DISPENSED
Start: 2017-02-09

## (undated) RX ORDER — CEFAZOLIN SODIUM 1 G/3ML
INJECTION, POWDER, FOR SOLUTION INTRAMUSCULAR; INTRAVENOUS
Status: DISPENSED
Start: 2017-02-09

## (undated) RX ORDER — ACETAMINOPHEN 325 MG/1
TABLET ORAL
Status: DISPENSED
Start: 2017-02-09

## (undated) RX ORDER — CEFAZOLIN SODIUM 2 G/100ML
INJECTION, SOLUTION INTRAVENOUS
Status: DISPENSED
Start: 2017-02-09

## (undated) RX ORDER — GLYCOPYRROLATE 0.2 MG/ML
INJECTION, SOLUTION INTRAMUSCULAR; INTRAVENOUS
Status: DISPENSED
Start: 2017-02-09

## (undated) RX ORDER — DEXAMETHASONE SODIUM PHOSPHATE 4 MG/ML
INJECTION, SOLUTION INTRA-ARTICULAR; INTRALESIONAL; INTRAMUSCULAR; INTRAVENOUS; SOFT TISSUE
Status: DISPENSED
Start: 2017-02-09